# Patient Record
Sex: MALE | Race: WHITE | NOT HISPANIC OR LATINO | URBAN - METROPOLITAN AREA
[De-identification: names, ages, dates, MRNs, and addresses within clinical notes are randomized per-mention and may not be internally consistent; named-entity substitution may affect disease eponyms.]

---

## 2019-06-19 ENCOUNTER — OFFICE VISIT - HEALTHEAST (OUTPATIENT)
Dept: GERIATRICS | Facility: CLINIC | Age: 74
End: 2019-06-19

## 2019-06-19 DIAGNOSIS — I15.9 SECONDARY HYPERTENSION: ICD-10-CM

## 2019-06-19 DIAGNOSIS — F32.9 MAJOR DEPRESSIVE DISORDER, REMISSION STATUS UNSPECIFIED, UNSPECIFIED WHETHER RECURRENT: ICD-10-CM

## 2019-06-19 DIAGNOSIS — M25.461 EFFUSION OF RIGHT KNEE: ICD-10-CM

## 2019-06-19 DIAGNOSIS — I50.9 CONGESTIVE HEART FAILURE, UNSPECIFIED HF CHRONICITY, UNSPECIFIED HEART FAILURE TYPE (H): ICD-10-CM

## 2019-06-19 DIAGNOSIS — I48.19 PERSISTENT ATRIAL FIBRILLATION (H): ICD-10-CM

## 2019-06-20 ENCOUNTER — RECORDS - HEALTHEAST (OUTPATIENT)
Dept: LAB | Facility: CLINIC | Age: 74
End: 2019-06-20

## 2019-06-20 ENCOUNTER — AMBULATORY - HEALTHEAST (OUTPATIENT)
Dept: ADMINISTRATIVE | Facility: CLINIC | Age: 74
End: 2019-06-20

## 2019-06-20 RX ORDER — TORSEMIDE 10 MG/1
10 TABLET ORAL DAILY PRN
Status: SHIPPED | COMMUNITY
Start: 2019-06-20

## 2019-06-20 RX ORDER — ACETAMINOPHEN 500 MG
1000 TABLET ORAL 3 TIMES DAILY
Status: SHIPPED | COMMUNITY
Start: 2019-06-20

## 2019-06-21 LAB
ANION GAP SERPL CALCULATED.3IONS-SCNC: 8 MMOL/L (ref 5–18)
BUN SERPL-MCNC: 15 MG/DL (ref 8–28)
CALCIUM SERPL-MCNC: 8.7 MG/DL (ref 8.5–10.5)
CHLORIDE BLD-SCNC: 107 MMOL/L (ref 98–107)
CO2 SERPL-SCNC: 26 MMOL/L (ref 22–31)
CREAT SERPL-MCNC: 1.1 MG/DL (ref 0.7–1.3)
GFR SERPL CREATININE-BSD FRML MDRD: >60 ML/MIN/1.73M2
GLUCOSE BLD-MCNC: 104 MG/DL (ref 70–125)
POTASSIUM BLD-SCNC: 3.7 MMOL/L (ref 3.5–5)
SODIUM SERPL-SCNC: 141 MMOL/L (ref 136–145)

## 2019-06-27 ENCOUNTER — OFFICE VISIT - HEALTHEAST (OUTPATIENT)
Dept: GERIATRICS | Facility: CLINIC | Age: 74
End: 2019-06-27

## 2019-06-27 DIAGNOSIS — M25.461 EFFUSION OF RIGHT KNEE: ICD-10-CM

## 2019-06-27 DIAGNOSIS — I50.9 CONGESTIVE HEART FAILURE, UNSPECIFIED HF CHRONICITY, UNSPECIFIED HEART FAILURE TYPE (H): ICD-10-CM

## 2019-06-27 DIAGNOSIS — I48.19 PERSISTENT ATRIAL FIBRILLATION (H): ICD-10-CM

## 2019-06-27 DIAGNOSIS — I15.9 SECONDARY HYPERTENSION: ICD-10-CM

## 2019-07-05 ENCOUNTER — OFFICE VISIT - HEALTHEAST (OUTPATIENT)
Dept: GERIATRICS | Facility: CLINIC | Age: 74
End: 2019-07-05

## 2019-07-05 DIAGNOSIS — I50.9 CONGESTIVE HEART FAILURE, UNSPECIFIED HF CHRONICITY, UNSPECIFIED HEART FAILURE TYPE (H): ICD-10-CM

## 2019-07-05 DIAGNOSIS — I15.9 SECONDARY HYPERTENSION: ICD-10-CM

## 2019-07-05 DIAGNOSIS — Z87.898 HISTORY OF SYNCOPE: ICD-10-CM

## 2019-07-05 DIAGNOSIS — F32.9 MAJOR DEPRESSIVE DISORDER, REMISSION STATUS UNSPECIFIED, UNSPECIFIED WHETHER RECURRENT: ICD-10-CM

## 2019-07-05 DIAGNOSIS — I48.19 PERSISTENT ATRIAL FIBRILLATION (H): ICD-10-CM

## 2019-07-09 ENCOUNTER — OFFICE VISIT - HEALTHEAST (OUTPATIENT)
Dept: GERIATRICS | Facility: CLINIC | Age: 74
End: 2019-07-09

## 2019-07-09 DIAGNOSIS — I50.9 CONGESTIVE HEART FAILURE, UNSPECIFIED HF CHRONICITY, UNSPECIFIED HEART FAILURE TYPE (H): ICD-10-CM

## 2019-07-09 DIAGNOSIS — Z87.898 HISTORY OF SYNCOPE: ICD-10-CM

## 2019-07-09 DIAGNOSIS — M54.9 CHRONIC BACK PAIN, UNSPECIFIED BACK LOCATION, UNSPECIFIED BACK PAIN LATERALITY: ICD-10-CM

## 2019-07-09 DIAGNOSIS — I10 ESSENTIAL HYPERTENSION: ICD-10-CM

## 2019-07-09 DIAGNOSIS — Z79.01 ANTICOAGULATION ADEQUATE: ICD-10-CM

## 2019-07-09 DIAGNOSIS — G89.29 CHRONIC BACK PAIN, UNSPECIFIED BACK LOCATION, UNSPECIFIED BACK PAIN LATERALITY: ICD-10-CM

## 2019-07-09 DIAGNOSIS — I48.19 PERSISTENT ATRIAL FIBRILLATION (H): ICD-10-CM

## 2019-07-09 DIAGNOSIS — I48.92 ATRIAL FLUTTER, UNSPECIFIED TYPE (H): ICD-10-CM

## 2019-07-11 ENCOUNTER — AMBULATORY - HEALTHEAST (OUTPATIENT)
Dept: GERIATRICS | Facility: CLINIC | Age: 74
End: 2019-07-11

## 2020-03-02 ENCOUNTER — OFFICE VISIT - HEALTHEAST (OUTPATIENT)
Dept: GERIATRICS | Facility: CLINIC | Age: 75
End: 2020-03-02

## 2020-03-02 ENCOUNTER — AMBULATORY - HEALTHEAST (OUTPATIENT)
Dept: GERIATRICS | Facility: CLINIC | Age: 75
End: 2020-03-02

## 2020-03-02 DIAGNOSIS — R21 RASH: ICD-10-CM

## 2020-03-02 DIAGNOSIS — I48.19 PERSISTENT ATRIAL FIBRILLATION (H): ICD-10-CM

## 2020-03-02 DIAGNOSIS — I50.9 CONGESTIVE HEART FAILURE, UNSPECIFIED HF CHRONICITY, UNSPECIFIED HEART FAILURE TYPE (H): ICD-10-CM

## 2020-03-02 DIAGNOSIS — S82.831D CLOSED FRACTURE OF DISTAL END OF RIGHT FIBULA WITH ROUTINE HEALING, UNSPECIFIED FRACTURE MORPHOLOGY, SUBSEQUENT ENCOUNTER: ICD-10-CM

## 2020-03-02 RX ORDER — FERROUS SULFATE 325(65) MG
1 TABLET ORAL
Status: SHIPPED | COMMUNITY
Start: 2020-03-02

## 2020-03-02 RX ORDER — METOPROLOL SUCCINATE 25 MG/1
12.5 TABLET, EXTENDED RELEASE ORAL DAILY
Status: SHIPPED | COMMUNITY
Start: 2020-03-02

## 2020-03-02 RX ORDER — LISINOPRIL 2.5 MG/1
2.5 TABLET ORAL DAILY
Status: SHIPPED | COMMUNITY
Start: 2020-03-02

## 2020-03-04 ENCOUNTER — OFFICE VISIT - HEALTHEAST (OUTPATIENT)
Dept: GERIATRICS | Facility: CLINIC | Age: 75
End: 2020-03-04

## 2020-03-04 DIAGNOSIS — B02.8 HERPES ZOSTER WITH COMPLICATION: ICD-10-CM

## 2020-03-06 ENCOUNTER — OFFICE VISIT - HEALTHEAST (OUTPATIENT)
Dept: GERIATRICS | Facility: CLINIC | Age: 75
End: 2020-03-06

## 2020-03-06 DIAGNOSIS — M10.9 ACUTE GOUT INVOLVING TOE OF LEFT FOOT, UNSPECIFIED CAUSE: ICD-10-CM

## 2020-03-12 ENCOUNTER — OFFICE VISIT - HEALTHEAST (OUTPATIENT)
Dept: GERIATRICS | Facility: CLINIC | Age: 75
End: 2020-03-12

## 2020-03-12 DIAGNOSIS — I42.9 CARDIOMYOPATHY, UNSPECIFIED TYPE (H): ICD-10-CM

## 2020-03-12 DIAGNOSIS — I48.92 ATRIAL FLUTTER, UNSPECIFIED TYPE (H): ICD-10-CM

## 2020-03-12 DIAGNOSIS — I10 ESSENTIAL HYPERTENSION: ICD-10-CM

## 2020-03-12 DIAGNOSIS — S82.831D CLOSED FRACTURE OF DISTAL END OF RIGHT FIBULA WITH ROUTINE HEALING, UNSPECIFIED FRACTURE MORPHOLOGY, SUBSEQUENT ENCOUNTER: ICD-10-CM

## 2020-03-17 ENCOUNTER — OFFICE VISIT - HEALTHEAST (OUTPATIENT)
Dept: GERIATRICS | Facility: CLINIC | Age: 75
End: 2020-03-17

## 2020-03-17 DIAGNOSIS — I10 ESSENTIAL HYPERTENSION: ICD-10-CM

## 2020-03-17 DIAGNOSIS — S82.831D CLOSED FRACTURE OF DISTAL END OF RIGHT FIBULA WITH ROUTINE HEALING, UNSPECIFIED FRACTURE MORPHOLOGY, SUBSEQUENT ENCOUNTER: ICD-10-CM

## 2020-03-17 DIAGNOSIS — I50.9 CONGESTIVE HEART FAILURE, UNSPECIFIED HF CHRONICITY, UNSPECIFIED HEART FAILURE TYPE (H): ICD-10-CM

## 2020-03-17 DIAGNOSIS — I48.92 ATRIAL FLUTTER, UNSPECIFIED TYPE (H): ICD-10-CM

## 2020-03-20 ENCOUNTER — RECORDS - HEALTHEAST (OUTPATIENT)
Dept: LAB | Facility: CLINIC | Age: 75
End: 2020-03-20

## 2020-03-20 LAB
ANION GAP SERPL CALCULATED.3IONS-SCNC: 10 MMOL/L (ref 5–18)
BUN SERPL-MCNC: 12 MG/DL (ref 8–28)
CALCIUM SERPL-MCNC: 8.7 MG/DL (ref 8.5–10.5)
CHLORIDE BLD-SCNC: 106 MMOL/L (ref 98–107)
CO2 SERPL-SCNC: 24 MMOL/L (ref 22–31)
CREAT SERPL-MCNC: 1.16 MG/DL (ref 0.7–1.3)
ERYTHROCYTE [DISTWIDTH] IN BLOOD BY AUTOMATED COUNT: ABNORMAL %
GFR SERPL CREATININE-BSD FRML MDRD: >60 ML/MIN/1.73M2
GLUCOSE BLD-MCNC: 90 MG/DL (ref 70–125)
HCT VFR BLD AUTO: 37.5 % (ref 40–54)
HGB BLD-MCNC: 10.9 G/DL (ref 14–18)
MCH RBC QN AUTO: 24.2 PG (ref 27–34)
MCHC RBC AUTO-ENTMCNC: 29.1 G/DL (ref 32–36)
MCV RBC AUTO: 83 FL (ref 80–100)
PLATELET # BLD AUTO: 327 THOU/UL (ref 140–440)
PMV BLD AUTO: 10.2 FL (ref 8.5–12.5)
POTASSIUM BLD-SCNC: 4 MMOL/L (ref 3.5–5)
RBC # BLD AUTO: 4.51 MILL/UL (ref 4.4–6.2)
SODIUM SERPL-SCNC: 140 MMOL/L (ref 136–145)
WBC: 4.9 THOU/UL (ref 4–11)

## 2020-03-23 ENCOUNTER — OFFICE VISIT - HEALTHEAST (OUTPATIENT)
Dept: GERIATRICS | Facility: CLINIC | Age: 75
End: 2020-03-23

## 2020-03-23 DIAGNOSIS — S82.831D CLOSED FRACTURE OF DISTAL END OF RIGHT FIBULA WITH ROUTINE HEALING, UNSPECIFIED FRACTURE MORPHOLOGY, SUBSEQUENT ENCOUNTER: ICD-10-CM

## 2020-03-23 DIAGNOSIS — I48.19 PERSISTENT ATRIAL FIBRILLATION (H): ICD-10-CM

## 2020-03-23 RX ORDER — AMOXICILLIN 250 MG
1 CAPSULE ORAL 2 TIMES DAILY PRN
Status: SHIPPED | COMMUNITY
Start: 2020-03-23

## 2020-03-25 ENCOUNTER — AMBULATORY - HEALTHEAST (OUTPATIENT)
Dept: GERIATRICS | Facility: CLINIC | Age: 75
End: 2020-03-25

## 2021-05-29 NOTE — PROGRESS NOTES
[facility]    Facility:   [facility] [[id number]]   Code Status: FULL CODE      CHIEF COMPLAINT/REASON FOR VISIT:  Chief Complaint   Patient presents with     Review Of Multiple Medical Conditions       HISTORY:      HPI: Chip is a 73 y.o. male undergoing physical and occupational therapy at [facility].     Today he is seen for  Routine first visit to review multiple medical issues. He denied CP or  shortness of breath however he is on 2L NC.  Records indicated that he quit smoking years ago but he is found to have cigarettes among his belongings. He tells me he started again and refused interventions when offered. He reports no BM x 5 days. He is passing flatus and had no abdominal tenderness. There were orders that he was discharged with an ACT monitor. He tells me it was taken of fin the hospital. Staff to follow up with orders. He will have a BMP on 6/21/19. He is on daily weights with PRN torsemide .     Past Medical History:   Diagnosis Date     Chronic lower back pain      Hypertension      Opiate abuse, continuous              No family history on file.  Social History     Socioeconomic History     Marital status:      Spouse name: Not on file     Number of children: Not on file     Years of education: Not on file     Highest education level: Not on file   Occupational History     Not on file   Social Needs     Financial resource strain: Not on file     Food insecurity:     Worry: Not on file     Inability: Not on file     Transportation needs:     Medical: Not on file     Non-medical: Not on file   Tobacco Use     Smoking status: Unknown If Ever Smoked   Substance and Sexual Activity     Alcohol use: No     Drug use: No     Sexual activity: Never   Lifestyle     Physical activity:     Days per week: Not on file     Minutes per session: Not on file     Stress: Not on file   Relationships     Social connections:     Talks on phone: Not  on file     Gets together: Not on file     Attends Zoroastrianism service: Not on file     Active member of club or organization: Not on file     Attends meetings of clubs or organizations: Not on file     Relationship status: Not on file     Intimate partner violence:     Fear of current or ex partner: Not on file     Emotionally abused: Not on file     Physically abused: Not on file     Forced sexual activity: Not on file   Other Topics Concern     Not on file   Social History Narrative     Not on file         Review of Systems   Constitutional: Negative for activity change, appetite change, chills, fatigue and fever.   HENT: Negative for congestion and sore throat.    Eyes: Negative for visual disturbance.   Respiratory: Positive for shortness of breath. Negative for cough and wheezing.         Currently on 2L oxygen NC    Cardiovascular: Negative for chest pain and leg swelling.   Gastrointestinal: Negative for abdominal distention, abdominal pain, constipation, diarrhea and nausea.   Genitourinary: Negative for dysuria.   Musculoskeletal: Negative for arthralgias and myalgias.   Skin: Negative for color change, rash and wound.   Neurological: Negative for dizziness, weakness and numbness.   Psychiatric/Behavioral: Negative for agitation, behavioral problems and sleep disturbance.       .  Vitals:    06/19/19 0841   BP: 127/81   Pulse: 74   Resp: 18   Temp: 97.4  F (36.3  C)   SpO2: 95%   Weight: (!) 281 lb 8 oz (127.7 kg)       Physical Exam   Constitutional: He is oriented to person, place, and time. He appears well-developed and well-nourished.   Pleasant gentleman in no acute distress.    HENT:   Head: Normocephalic and atraumatic.   Eyes: Pupils are equal, round, and reactive to light. Conjunctivae are normal.   Neck: Normal range of motion. Neck supple.   Cardiovascular: Normal rate, regular rhythm and normal heart sounds.   No murmur heard.  Pulmonary/Chest: Effort normal and breath sounds normal. He has no  wheezes. He has no rales.   Abdominal: Soft. Bowel sounds are normal. He exhibits no distension. There is no tenderness.   Musculoskeletal: Normal range of motion. He exhibits no edema.   Right knee effusion    Neurological: He is alert and oriented to person, place, and time.   Skin: Skin is warm and dry.   Psychiatric: He has a normal mood and affect. His behavior is normal.         LABS:   No results found for this or any previous visit (from the past 240 hour(s)).  Current Outpatient Medications   Medication Sig     aspirin 81 mg chewable tablet Chew 1 tablet (81 mg total) daily.     atorvastatin (LIPITOR) 20 MG tablet Take 20 mg by mouth bedtime.     beclomethasone (QVAR) 80 mcg/actuation inhaler Inhale 1 puff 2 (two) times a day.     diltiazem (CARDIZEM CD) 240 MG 24 hr capsule Take 240 mg by mouth daily.     DULoxetine (CYMBALTA) 30 MG capsule Take 1 capsule (30 mg total) by mouth every morning.     metoprolol tartrate (LOPRESSOR) 50 MG tablet Take 50 mg by mouth 2 (two) times a day.     nitroglycerin (NITROSTAT) 0.4 MG SL tablet Place 0.4 mg under the tongue every 5 (five) minutes as needed for chest pain.     omeprazole (PRILOSEC) 20 MG capsule Take 1 capsule (20 mg total) by mouth Daily before breakfast.     senna-docusate (PERICOLACE) 8.6-50 mg tablet Take 2 tablets by mouth 2 (two) times a day.     warfarin (COUMADIN) 1 MG tablet Take 1.5 tablet by mouth daily per pharmacist order.. Adjust dose based on INR results on 9/26/2016.as directed.     ASSESSMENT:      ICD-10-CM    1. Persistent atrial fibrillation (H) I48.1    2. Major depressive disorder, remission status unspecified, unspecified whether recurrent F32.9    3. Congestive heart failure, unspecified HF chronicity, unspecified heart failure type (H) I50.9    4. Secondary hypertension I15.9    5. Effusion of right knee M25.461        PLAN:    Atrial fibrillation- on coumadin and metoprolol  CHF- daily weights, prn torsemide   Major depression on  Cymbal  Tobacco abuse records indicate he quit 8 years ago however he restarted and declined interventions.   Hypertension- on metoprolol-stable.  Right knee effusion-pain control, ice, Scheduled tylenol, seen by orthopedic who declined tapping the knee at this time due to risk of infection- follow up if worsens.   Constipation - bowel medications adjusted.   Anticoagulation - On coumadin Adjust medication per INR results.     Electronically signed by: Malina Verdin CNP

## 2021-05-30 NOTE — PROGRESS NOTES
[facility]      Facility:    [facility] [[id number]]  Code Status: FULL CODE       Chief Complaint/Reason for Visit:  Chief Complaint   Patient presents with     H & P     Admit note to TCU-syncope, ischemic CM, aflutter.        HPI:   Chip is a 73 y.o. male with hx of ischemic CM, chronic back pain, CKD, HTN, aflutter on xarelto, presented to the hospital on 6/13/19 with syncope. His hospital discharge summary is partially excerpted below.     Hospital Course:   Chip Martin is a 73 y.o. male with PMHx of CAD, MI s/p BMS x2 to LAD and PTCA to D1 (2012), ischemic cardiomyopathy (LVEF 40-45%), paroxysmal atrial flutter (on sotalol and Xarelto), hypertension, dyslipidemia, CKD 3, DAVID (intolerant to CPAP), 100-pack-year history of smoking who was recently hospitalized for similar presentation of syncope and collapse at [facility] 6/2-6/3 and prior to that on 3/11-3/13.  He was seen by Cardiology during hospitalization in 3/2019 and as outpatient after hospital discharge on 4/4.  Syncope was considered related to medication effect therefore diltiazem was discontinued and Coreg was titrated to 6.25 milligrams twice daily after cardiology visit on 4/4/2019.  Nuclear perfusion cardiac stress test was planned to be done before his appointment with Dr. Montoya in 4 to 5 months after his 4/2019 appointment with Cardiology.  He remains intolerant to CPAP and is not on home oxygen.  [place] [employer], felt lightheaded and passed out. ED evaluation showed mild hypoxia of 86% on room air, /81 pulse 69 in sinus rhythm.  Troponin negative, EKG unremarkable for ischemia or arrhythmia. Chest radiograph shows no infiltrate or CHF.  BNP is 24.  He was given IV fluids, started on oxygen and he started feeling better. He was admitted for further evaluation.    Cardiology was consulted following admission. Etiology unclear, appears neurocardiogenic (orthostatis  negative, no profound hypotension). Monitored on telemetry to exclude arrhythmic etiology. TTE was technically difficult, but LVEF 40-45% with mild global hypokinesis with possible akinetic apical lateral segment, no significant valvular disease, and carotid sinus massage did not result in sinus arrest. Dose of beta blocker was reduced, and Cardiology recommended 14 ACT monitor upon dismissal to TCU. MPI can be completed as outpatient.    During the hospitalization he was also seen by Pulmonology for mild hypercapnea and hypoxemia. Likely due to COPD and ANA MARIA, will require outpatient pulmonary evaluation and should return to [facility] for follow-up when able. He is discharging to TCU on supplemental oxygen 2L NC.    The patient was also seen by orthopedics for significant R knee effusion. Diagnostic and therapeutic tap was considered but deferred due to concern for infectious risk. He had symptomatic improvement with anti-inflammatories. He did have elevated uric acid 7.8 and was started on low dose allopurinol earlier in the hospitalization. If persistent swelling and/or pain, can reconsider joint tap.    Overall stabilized and discharged to TCU on 6/18/19 for PT, OT, nursing cares, medical management and monitoring.     Today:  He has been doing well, participating with therapy, gaining strength. He plans to return home to independent living later this week. He currently denies any chest pain, shortness of breath. Appetite is good. No diarrhea or constipation. No dizziness or syncopal episodes in TCU. He has no new vision or hearing concerns.  Complains of chronic back and knee pain, no new concerns.       Past Medical History:  Past Medical History:   Diagnosis Date     ALEX (acute kidney injury) (H)      Atrial fibrillation (H)      CAD (coronary artery disease)      Chronic bronchitis (H)      Chronic hypercapnic respiratory failure (H)      Chronic lower back pain      CKD (chronic kidney disease)      COPD (chronic  obstructive pulmonary disease) (H)      Depressive disorder      ED (erectile dysfunction)      HLD (hyperlipidemia)      Hypertension      Ischemic cardiomyopathy      Knee effusion, right      Lumbago      Opiate abuse, continuous (H)      DAVID (obstructive sleep apnea)      Prostate cancer (H)      Syncope      Tobacco use disorder      Vitamin D deficiency            Surgical History:  Past Surgical History:   Procedure Laterality Date     BACK SURGERY       CORONARY ANGIOPLASTY       LAPAROSCOPIC CHOLECYSTECTOMY  2016     PROSTATE BIOPSY         Family History:   Family History   Problem Relation Age of Onset     Heart disease Father      Heart disease Brother        Social History:    Social History     Socioeconomic History     Marital status:      Spouse name: Not on file     Number of children: Not on file     Years of education: Not on file     Highest education level: Not on file   Occupational History     Not on file   Social Needs     Financial resource strain: Not on file     Food insecurity:     Worry: Not on file     Inability: Not on file     Transportation needs:     Medical: Not on file     Non-medical: Not on file   Tobacco Use     Smoking status: Former Smoker     Packs/day: 2.00     Years: 50.00     Pack years: 100.00     Types: Cigarettes     Last attempt to quit: 2011     Years since quittin.5     Smokeless tobacco: Never Used   Substance and Sexual Activity     Alcohol use: No     Drug use: No     Sexual activity: Never   Lifestyle     Physical activity:     Days per week: Not on file     Minutes per session: Not on file     Stress: Not on file   Relationships     Social connections:     Talks on phone: Not on file     Gets together: Not on file     Attends Mosque service: Not on file     Active member of club or organization: Not on file     Attends meetings of clubs or organizations: Not on file     Relationship status: Not on file     Intimate partner violence:     " Fear of current or ex partner: Not on file     Emotionally abused: Not on file     Physically abused: Not on file     Forced sexual activity: Not on file   Other Topics Concern     Not on file   Social History Narrative     Not on file       Medications:  Current Outpatient Medications   Medication Sig     acetaminophen (TYLENOL) 500 MG tablet Take 1,000 mg by mouth every 6 (six) hours as needed for pain.     allopurinol (ZYLOPRIM) 100 MG tablet Take 100 mg by mouth daily.     aspirin 81 mg chewable tablet Chew 1 tablet (81 mg total) daily.     atorvastatin (LIPITOR) 20 MG tablet Take 40 mg by mouth at bedtime.            carvedilol (COREG) 3.125 MG tablet Take 3.125 mg by mouth 2 (two) times a day with meals.     fluticasone furoate-vilanterol (BREO ELLIPTA) 200-25 mcg/dose DsDv inhaler Inhale 1 puff daily.     nitroglycerin (NITROSTAT) 0.4 MG SL tablet Place 0.4 mg under the tongue every 5 (five) minutes as needed for chest pain.     polyethylene glycol (MIRALAX) 17 gram packet Take 17 g by mouth daily as needed.     rivaroxaban (XARELTO) 20 mg tablet Take 20 mg by mouth daily with supper.     senna-docusate (PERICOLACE) 8.6-50 mg tablet Take 2 tablets by mouth 2 (two) times a day. (Patient taking differently: Take 2 tablets by mouth 2 (two) times a day as needed.       )     sotalol (BETAPACE) 80 MG tablet Take 40 mg by mouth at bedtime.     torsemide (DEMADEX) 10 MG tablet Take 10 mg by mouth daily as needed. Weight gain over 3 lbs or increased leg swelling       Allergies:  Allergies   Allergen Reactions     Blood-Group Specific Substance      Patient has Anti-Big E antibody.  Blood product orders may be delayed.  Please draw one red top and two lavender top tubes for all Type and Screen/Type and Crossmatch orders.     Gadopentetate Dimeglumine      Other reaction(s): Syncope  \"on the floor\", states that he passed out     Gadoversetamide      Other reaction(s): Syncope  \"on the floor\", states that he passed " out     Iodinated Contrast- Oral And Iv Dye      Syncope          Review of Systems:  Pertinent items are noted in HPI.      Physical Exam:   General: Patient is alert male, no distress.   Vitals: /78, Temp 98, Pulse 78, RR 18, O2 sat 94% RA.  HEENT: Head is NCAT. Eyes show no injection or icterus. Nares negative. Oropharynx well hydrated.  Neck: Supple. No tenderness or adenopathy. No JVD.  Lungs: Diminished though clear bilaterally. No wheezes.  Cardiovascular: Regular rate and rhythm, normal S1. S2.  Back: No spinal or CVA tenderness.  Abdomen: Obese, soft, no tenderness on exam. Bowel sounds present. No guarding rebound or rigidity.  : Deferred.  Extremities: Mod LE edema is noted.  Musculoskeletal: Age related degen changes.   Skin: No rashes.   Psych: Mood appears good.      Labs:  Results for orders placed or performed in visit on 06/21/19   Basic Metabolic Panel   Result Value Ref Range    Sodium 141 136 - 145 mmol/L    Potassium 3.7 3.5 - 5.0 mmol/L    Chloride 107 98 - 107 mmol/L    CO2 26 22 - 31 mmol/L    Anion Gap, Calculation 8 5 - 18 mmol/L    Glucose 104 70 - 125 mg/dL    Calcium 8.7 8.5 - 10.5 mg/dL    BUN 15 8 - 28 mg/dL    Creatinine 1.10 0.70 - 1.30 mg/dL    GFR MDRD Af Amer >60 >60 mL/min/1.73m2    GFR MDRD Non Af Amer >60 >60 mL/min/1.73m2       Assessment/Plan:  1. History of syncope. No episodes in TCU. Had cardiac monitor, will follow up with cardiology. Meds adjusted in the hospital.   2. HTN. BPs satisfactory overall. Continue on coreg.  3. Aflutter. Rate controlled, on sotalol and xarelto.  4. Chronic back pain. Baseline, no new concerns.   5. Chronic bronchitis. No respiratory concerns.  6. CKD. Labs as noted above. ALEX in the hospital.   7. Right knee effusion. Improved. Follow up with ortho if further concerns.  8. DAVID. Intolerant of CPAP.  9. Ischemic CM. On carvedilol and torsemide.  10. Code status is full code.            Electronically signed by: Michaela Navarro  MD

## 2021-05-30 NOTE — PROGRESS NOTES
[facility]    Facility:   [facility] [[id number]]   Code Status: FULL CODE  PCP: Lynne Gomez MD   Phone: [phone]   Fax: [phone]      CHIEF COMPLAINT/REASON FOR VISIT:  Chief Complaint   Patient presents with     Discharge Summary       HISTORY COURSE:  Chip is a 73 y.o. male undergoing physical and occupational therapy at [facility].  He is with PMHx of CAD, MI s/p BMS x2 to LAD and PTCA to D1 (2012), ischemic cardiomyopathy (LVEF 40-45%), paroxysmal atrial flutter (on sotalol and Xarelto), hypertension, dyslipidemia, CKD 3, DAVID (intolerant to CPAP), 100-pack-year history of smoking who was recently hospitalized for similar presentation of syncope and collapse at [facility] 6/2-6/3 and prior to that on 3/11-3/13.  He was seen by Cardiology during hospitalization in 3/2019 and as outpatient after hospital discharge on 4/4.  Syncope was considered related to medication effect therefore diltiazem was discontinued and Coreg was titrated to 6.25 milligrams twice daily after cardiology visit on 4/4/2019.  Nuclear perfusion cardiac stress test was planned to be done before his appointment with Dr. Montoya in 4 to 5 months after his 4/2019 appointment with Cardiology.  He remains intolerant to CPAP and is not on home oxygen.  [place] [employer], felt lightheaded and passed out. ED evaluation showed mild hypoxia of 86% on room air, /81 pulse 69 in sinus rhythm.  Troponin negative, EKG unremarkable for ischemia or arrhythmia. Chest radiograph shows no infiltrate or CHF.  BNP is 24.  He was given IV fluids, started on oxygen and he started feeling better. He was admitted for further evaluation.    Cardiology was consulted following admission. Etiology unclear, appears neurocardiogenic (orthostatis negative, no profound hypotension). Monitored on telemetry to exclude arrhythmic etiology. TTE was technically difficult, but LVEF  40-45% with mild global hypokinesis with possible akinetic apical lateral segment, no significant valvular disease, and carotid sinus massage did not result in sinus arrest. Dose of beta blocker was reduced, and Cardiology recommended 14 ACT monitor upon dismissal to TCU. MPI can be completed as outpatient.         Today he is seen for a face-to-face discharge.  He will discharge on 7/10/2019 back to his senior living apartment with current medications and treatments.  He will have [facility] home care services PT OT and a home health aide. He denied CP or  shortness of breath     He is moving his bowels and denied any urinary issues. His  BMP on 6/21/19 was within normal limits.  His lower extremity edema and right knee effusion are significantly decreased.  His gunter has been dc'd and he is voiding adequately.       Review of Systems  Constitutional: Negative for activity change, appetite change, chills, fatigue and fever.   HENT: Negative for congestion and sore throat.    Eyes: Negative for visual disturbance.   Respiratory: Positive for shortness of breath. Negative for cough and wheezing.    Cardiovascular: Negative for chest pain and leg swelling.   Gastrointestinal: Negative for abdominal distention, abdominal pain, constipation, diarrhea and nausea.   Genitourinary: Negative for dysuria.   Musculoskeletal: Negative for arthralgias and myalgias.   Skin: Negative for color change, rash and wound.   Neurological: Negative for dizziness, weakness and numbness.   Psychiatric/Behavioral: Negative for agitation, behavioral problems and sleep disturbance.   Vitals:    07/09/19 1146   BP: 125/67   Pulse: 76   Resp: 18   Temp: 97.5  F (36.4  C)   SpO2: 96%   Weight: (!) 273 lb 11.2 oz (124.1 kg)       Physical Exam  Constitutional: He is oriented to person, place, and time. He appears well-developed and well-nourished.   Pleasant gentleman in no acute distress.    HENT:   Head: Normocephalic and atraumatic.   Eyes: Pupils  are equal, round, and reactive to light. Conjunctivae are normal.   Neck: Normal range of motion. Neck supple.   Cardiovascular: Normal rate, regular rhythm and normal heart sounds.   No murmur heard.  Pulmonary/Chest: Effort normal and breath sounds normal. He has no wheezes. He has no rales.   Abdominal: Soft. Bowel sounds are normal. He exhibits no distension. There is no tenderness.   Musculoskeletal: Normal range of motion. He exhibits edema.   Right knee effusion   1+ LLE  2+ RLE   Neurological: He is alert and oriented to person, place, and time.   Skin: Skin is warm and dry.   Psychiatric: He has a normal mood and affect. His behavior is normal.      MEDICATION LIST:  Current Outpatient Medications   Medication Sig     acetaminophen (TYLENOL) 500 MG tablet Take 1,000 mg by mouth every 6 (six) hours as needed for pain.     allopurinol (ZYLOPRIM) 100 MG tablet Take 100 mg by mouth daily.     aspirin 81 mg chewable tablet Chew 1 tablet (81 mg total) daily.     atorvastatin (LIPITOR) 20 MG tablet Take 40 mg by mouth at bedtime.            carvedilol (COREG) 3.125 MG tablet Take 3.125 mg by mouth 2 (two) times a day with meals.     fluticasone furoate-vilanterol (BREO ELLIPTA) 200-25 mcg/dose DsDv inhaler Inhale 1 puff daily.     nitroglycerin (NITROSTAT) 0.4 MG SL tablet Place 0.4 mg under the tongue every 5 (five) minutes as needed for chest pain.     polyethylene glycol (MIRALAX) 17 gram packet Take 17 g by mouth daily as needed.     rivaroxaban (XARELTO) 20 mg tablet Take 20 mg by mouth daily with supper.     senna-docusate (PERICOLACE) 8.6-50 mg tablet Take 2 tablets by mouth 2 (two) times a day. (Patient taking differently: Take 2 tablets by mouth 2 (two) times a day as needed.       )     sotalol (BETAPACE) 80 MG tablet Take 40 mg by mouth at bedtime.     torsemide (DEMADEX) 10 MG tablet Take 10 mg by mouth daily as needed. Weight gain over 3 lbs or increased leg swelling       DISCHARGE DIAGNOSIS:     ICD-10-CM    1. History of syncope Z87.898    2. Congestive heart failure, unspecified HF chronicity, unspecified heart failure type (H) I50.9    3. Persistent atrial fibrillation (H) I48.1    4. Anticoagulation adequate Z79.01        MEDICAL EQUIPMENT NEEDS:  None needed    DISCHARGE PLAN/FACE TO FACE:  I certify that services are/were furnished while this patient was under the care of a physician and that a physician or an allowed non-physician practitioner (NPP), had a face-to-face encounter that meets the physician face-to-face encounter requirements. The encounter was in whole, or in part, related to the primary reason for home health. The patient is confined to his/her home and needs intermittent skilled nursing, physical therapy, speech-language pathology, or the continued need for occupational therapy. A plan of care has been established by a physician and is periodically reviewed by a physician.  Date of Face-to-Face Encounter: 7/9/19    I certify that, based on my findings, the following services are medically necessary home health services: [facility] PT/OT/HHA    My clinical findings support the need for the above skilled services because: PT OT for continued strength and endurance following recent hospitalization for syncope, home health aide to assist with activities of daily living.    This patient is homebound because: The patient is homebound because he is easily fatigued and deconditioned following recent hospitalization for syncope, requiring continued PT OT.    The patient is, or has been, under my care and I have initiated the establishment of the plan of care. This patient will be followed by a physician who will periodically review the plan of care.    Schedule follow up visit with primary care provider within 7 days to reestablish care.    Electronically signed by: Malina Verdin CNP     Electronically signed by: Michaela Navarro MD

## 2021-05-30 NOTE — PROGRESS NOTES
[facility]    Facility:   [facility] [[id number]]   Code Status: FULL CODE      CHIEF COMPLAINT/REASON FOR VISIT:  Chief Complaint   Patient presents with     Review Of Multiple Medical Conditions       HISTORY:      HPI: Chip is a 73 y.o. male undergoing physical and occupational therapy at [facility].  He is with PMHx of CAD, MI s/p BMS x2 to LAD and PTCA to D1 (2012), ischemic cardiomyopathy (LVEF 40-45%), paroxysmal atrial flutter (on sotalol and Xarelto), hypertension, dyslipidemia, CKD 3, DAVID (intolerant to CPAP), 100-pack-year history of smoking who was recently hospitalized for similar presentation of syncope and collapse at [facility] 6/2-6/3 and prior to that on 3/11-3/13.  He was seen by Cardiology during hospitalization in 3/2019 and as outpatient after hospital discharge on 4/4.  Syncope was considered related to medication effect therefore diltiazem was discontinued and Coreg was titrated to 6.25 milligrams twice daily after cardiology visit on 4/4/2019.  Nuclear perfusion cardiac stress test was planned to be done before his appointment with Dr. Montoya in 4 to 5 months after his 4/2019 appointment with Cardiology.  He remains intolerant to CPAP and is not on home oxygen.  He was walking in [employer], felt lightheaded and passed out. ED evaluation showed mild hypoxia of 86% on room air, /81 pulse 69 in sinus rhythm.  Troponin negative, EKG unremarkable for ischemia or arrhythmia. Chest radiograph shows no infiltrate or CHF.  BNP is 24.  He was given IV fluids, started on oxygen and he started feeling better. He was admitted for further evaluation.    Cardiology was consulted following admission. Etiology unclear, appears neurocardiogenic (orthostatis negative, no profound hypotension). Monitored on telemetry to exclude arrhythmic etiology. TTE was technically difficult, but LVEF 40-45% with mild global hypokinesis with  possible akinetic apical lateral segment, no significant valvular disease, and carotid sinus massage did not result in sinus arrest. Dose of beta blocker was reduced, and Cardiology recommended 14 ACT monitor upon dismissal to TCU. MPI can be completed as outpatient.       Today he is seen for a  routine  visit to review multiple medical issues. He denied CP or  shortness of breath    He reports no BM x 2 days. And staff reported he has been refusing his bowel medications. He was educated on taking them unless he has loose stools. He is passing flatus and had no abdominal tenderness. His  BMP on 6/21/19 was within normal limits. . He is on daily weights with PRN torsemide . He is down 5.5 pounds over the last week. His lower extremity edema and right knee effusion are significantly decreased.  His gunter has been dc'd and he is voiding adequately. He is now wearing his heart monitor and will wear it until 7/8.    Past Medical History:   Diagnosis Date     ALEX (acute kidney injury) (H)      Atrial fibrillation (H)      CAD (coronary artery disease)      Chronic bronchitis (H)      Chronic hypercapnic respiratory failure (H)      Chronic lower back pain      CKD (chronic kidney disease)      COPD (chronic obstructive pulmonary disease) (H)      Depressive disorder      ED (erectile dysfunction)      HLD (hyperlipidemia)      Hypertension      Ischemic cardiomyopathy      Knee effusion, right      Lumbago      Opiate abuse, continuous (H)      DAVID (obstructive sleep apnea)      Prostate cancer (H)      Syncope      Tobacco use disorder      Vitamin D deficiency              Family History   Problem Relation Age of Onset     Heart disease Father      Heart disease Brother      Social History     Socioeconomic History     Marital status:      Spouse name: Not on file     Number of children: Not on file     Years of education: Not on file     Highest education level: Not on file   Occupational History     Not on  file   Social Needs     Financial resource strain: Not on file     Food insecurity:     Worry: Not on file     Inability: Not on file     Transportation needs:     Medical: Not on file     Non-medical: Not on file   Tobacco Use     Smoking status: Former Smoker     Packs/day: 2.00     Years: 50.00     Pack years: 100.00     Types: Cigarettes     Last attempt to quit: 2011     Years since quittin.4     Smokeless tobacco: Never Used   Substance and Sexual Activity     Alcohol use: No     Drug use: No     Sexual activity: Never   Lifestyle     Physical activity:     Days per week: Not on file     Minutes per session: Not on file     Stress: Not on file   Relationships     Social connections:     Talks on phone: Not on file     Gets together: Not on file     Attends Yarsanism service: Not on file     Active member of club or organization: Not on file     Attends meetings of clubs or organizations: Not on file     Relationship status: Not on file     Intimate partner violence:     Fear of current or ex partner: Not on file     Emotionally abused: Not on file     Physically abused: Not on file     Forced sexual activity: Not on file   Other Topics Concern     Not on file   Social History Narrative     Not on file         Review of Systems   Constitutional: Negative for activity change, appetite change, chills, fatigue and fever.   HENT: Negative for congestion and sore throat.    Eyes: Negative for visual disturbance.   Respiratory: Positive for shortness of breath. Negative for cough and wheezing.         Currently on 2L oxygen NC    Cardiovascular: Negative for chest pain and leg swelling.   Gastrointestinal: Negative for abdominal distention, abdominal pain, constipation, diarrhea and nausea.   Genitourinary: Negative for dysuria.   Musculoskeletal: Negative for arthralgias and myalgias.   Skin: Negative for color change, rash and wound.   Neurological: Negative for dizziness, weakness and numbness.    Psychiatric/Behavioral: Negative for agitation, behavioral problems and sleep disturbance.       .  Vitals:    06/27/19 0822   BP: 116/76   Pulse: 84   Resp: 20   Temp: 97.7  F (36.5  C)   SpO2: 92%   Weight: (!) 280 lb 3.2 oz (127.1 kg)       Physical Exam   Constitutional: He is oriented to person, place, and time. He appears well-developed and well-nourished.   Pleasant gentleman in no acute distress.    HENT:   Head: Normocephalic and atraumatic.   Eyes: Pupils are equal, round, and reactive to light. Conjunctivae are normal.   Neck: Normal range of motion. Neck supple.   Cardiovascular: Normal rate, regular rhythm and normal heart sounds.   No murmur heard.  Pulmonary/Chest: Effort normal and breath sounds normal. He has no wheezes. He has no rales.   Abdominal: Soft. Bowel sounds are normal. He exhibits no distension. There is no tenderness.   Musculoskeletal: Normal range of motion. He exhibits edema.   Right knee effusion   1+ LLE  2+ RLE   Neurological: He is alert and oriented to person, place, and time.   Skin: Skin is warm and dry.   Psychiatric: He has a normal mood and affect. His behavior is normal.         LABS:   Recent Results (from the past 240 hour(s))   Basic Metabolic Panel   Result Value Ref Range    Sodium 141 136 - 145 mmol/L    Potassium 3.7 3.5 - 5.0 mmol/L    Chloride 107 98 - 107 mmol/L    CO2 26 22 - 31 mmol/L    Anion Gap, Calculation 8 5 - 18 mmol/L    Glucose 104 70 - 125 mg/dL    Calcium 8.7 8.5 - 10.5 mg/dL    BUN 15 8 - 28 mg/dL    Creatinine 1.10 0.70 - 1.30 mg/dL    GFR MDRD Af Amer >60 >60 mL/min/1.73m2    GFR MDRD Non Af Amer >60 >60 mL/min/1.73m2     Current Outpatient Medications   Medication Sig     acetaminophen (TYLENOL) 500 MG tablet Take 1,000 mg by mouth every 6 (six) hours as needed for pain.     allopurinol (ZYLOPRIM) 100 MG tablet Take 100 mg by mouth daily.     aspirin 81 mg chewable tablet Chew 1 tablet (81 mg total) daily.     atorvastatin (LIPITOR) 20 MG  tablet Take 40 mg by mouth at bedtime.            carvedilol (COREG) 3.125 MG tablet Take 3.125 mg by mouth 2 (two) times a day with meals.     fluticasone furoate-vilanterol (BREO ELLIPTA) 200-25 mcg/dose DsDv inhaler Inhale 1 puff daily.     ipratropium-albuterol (DUO-NEB) 0.5-2.5 mg/3 mL nebulizer Inhale 3 mL 4 (four) times a day as needed.     nitroglycerin (NITROSTAT) 0.4 MG SL tablet Place 0.4 mg under the tongue every 5 (five) minutes as needed for chest pain.     polyethylene glycol (MIRALAX) 17 gram packet Take 17 g by mouth daily as needed.     rivaroxaban (XARELTO) 20 mg tablet Take 20 mg by mouth daily with supper.     senna-docusate (PERICOLACE) 8.6-50 mg tablet Take 2 tablets by mouth 2 (two) times a day.     sotalol (BETAPACE) 80 MG tablet Take 40 mg by mouth at bedtime.     torsemide (DEMADEX) 10 MG tablet Take 10 mg by mouth daily as needed. Weight gain over 3 lbs or increased leg swelling     warfarin (COUMADIN) 1 MG tablet Take 1.5 tablet by mouth daily per pharmacist order.. Adjust dose based on INR results on 9/26/2016.as directed.     ASSESSMENT:      ICD-10-CM    1. Persistent atrial fibrillation (H) I48.1    2. Congestive heart failure, unspecified HF chronicity, unspecified heart failure type (H) I50.9    3. Secondary hypertension I15.9    4. Effusion of right knee M25.461        PLAN:    Atrial fibrillation- on coumadin and metoprolol  CHF- daily weights, prn torsemide   Major depression on Cymbalta  Tobacco abuse records indicate he quit 8 years ago however he restarted and declined interventions.   Hypertension- on metoprolol-stable.  Right knee effusion-pain control, ice, Scheduled tylenol, seen by orthopedic who declined tapping the knee at this time due to risk of infection- follow up if worsens. Edema significantly reduced and pain controlled.   Constipation - Pt has been refusing bowel medications - educated on taking them  Anticoagulation - On coumadin Adjust medication per INR  results.     Electronically signed by: Malina Verdin CNP

## 2021-05-30 NOTE — PROGRESS NOTES
"Code Status:  FULL CODE  Visit Type: Follow Up     Facility:  [facility] [[id number]]        Facility Type: SNF (Skilled Nursing Facility, TCU)    History of Present Illness: Chip Martin is a 73 y.o. male, who I see today for a TCU follow up visit.  He has a past medical history for CAD, MI with PTCA, ischemic cardiomyopathy with EF of 40-45%, paroxysmal afib, hypertension, dyslipidemia, CKD3, DAVID, tobacco use.  He has multiple hospitalization in the past 3 months for syncopal episodes.  Most recent being 6/2/2019-6/3/2019 in which he felt lightheaded and passed out while walking into [employer].  He has had multiple workups with unclear etiology.  It is posited to be neurocardiogenic.  He will have a MPI as an outpatient.     Today, He reports that he has smoked recently and states \"I will do what I want, so if I feel like smoking I will do that, I don't smoke that often\".  He reports therapy is going well.  He denies any dizziness or lightheadedness since being at the TCU.  His lower extremities do have +1 pitting edema bilaterally.  He is not wearing compression stockings during our visit.   He continues to have pain in his right knee and low back in which he states is stable at this time.  He continues to have right knee effusion.  He reports his BMs have been normal however he has not gone for approximately 3 days.  He refuses to take any stool meds and it not able to articulate his reasoning.  I did  him on high fiber foods including prune juice.      Review of Systems   Patient denies fever, chills, headache, lightheadedness, dizziness, rhinorrhea, cough, congestion, shortness of breath, chest pain, palpitations, abdominal pain, n/v, diarrhea, constipation, change in appetite, dysuria, frequency, burning or pain with urination.  Other than stated in HPI all other review of systems is negative.         Physical Exam   Vital signs: /78, HR 80, resp 20, temp 96.9  GENERAL " APPEARANCE: Well developed, well nourished, in no acute distress.  HEENT: normocephalic, atraumatic  PERRL, sclerae anicteric, conjunctivae clear and moist, EOM intact  LUNGS: Lung sounds CTA, no adventitious sounds, respiratory effort normal.  CARD: RRR, S1, S2, without murmurs, gallops, rubs, no JVD, ABD: Soft and nontender with normal bowel sounds.   MSK: Muscle strength and tone were normal.  EXTREMITIES:1+ pitting edema bilateral lower extremities, right knee effusion without heat or redness.   NEURO: Alert and oriented x 3.  Face is symmetric.  SKIN: Inspection of the skin reveals no rashes, ulcerations or petechiae.  PSYCH: euthymic          Labs: No results found for this or any previous visit (from the past 240 hour(s)).      Assessment:  1. History of syncope     2. Persistent atrial fibrillation (H)     3. Congestive heart failure, unspecified HF chronicity, unspecified heart failure type (H)     4. Secondary hypertension     5. Major depressive disorder, remission status unspecified, unspecified whether recurrent         Plan:     Syncope: no episodes at the TCU, continue with therapy.  Counseled on sitting down if feeling dizzy.     Afib: continue on carvedilol, and xarelto, continue with ASA for CAD and hx of MI    CHF: continue on torsemide prn for leg swelling or increase in 3lbs.     HTN: continue on carvedilol and betapace.  BPs are stable.     Depression:  I did  on mood and how this can affect his health.  He reports that he is not depressed but he is frustrated with his situation.  I explained that depressed would be natural for his condition and the unknown circumstances.     Electronically signed by: Yaneth Palmer, CNP

## 2021-06-03 VITALS — BODY MASS INDEX: 40.39 KG/M2 | WEIGHT: 281.5 LBS

## 2021-06-03 VITALS — BODY MASS INDEX: 39.27 KG/M2 | WEIGHT: 273.7 LBS

## 2021-06-03 VITALS — WEIGHT: 280.2 LBS | BODY MASS INDEX: 40.2 KG/M2

## 2021-06-04 ENCOUNTER — RECORDS - HEALTHEAST (OUTPATIENT)
Dept: ADMINISTRATIVE | Facility: CLINIC | Age: 76
End: 2021-06-04

## 2021-06-04 VITALS
DIASTOLIC BLOOD PRESSURE: 68 MMHG | HEART RATE: 65 BPM | OXYGEN SATURATION: 92 % | SYSTOLIC BLOOD PRESSURE: 140 MMHG | WEIGHT: 264.6 LBS | TEMPERATURE: 98 F | BODY MASS INDEX: 37.97 KG/M2 | RESPIRATION RATE: 18 BRPM

## 2021-06-04 VITALS
BODY MASS INDEX: 38.88 KG/M2 | DIASTOLIC BLOOD PRESSURE: 72 MMHG | SYSTOLIC BLOOD PRESSURE: 150 MMHG | OXYGEN SATURATION: 93 % | HEART RATE: 70 BPM | TEMPERATURE: 98.5 F | WEIGHT: 271 LBS | RESPIRATION RATE: 18 BRPM

## 2021-06-04 VITALS
TEMPERATURE: 98.8 F | RESPIRATION RATE: 18 BRPM | BODY MASS INDEX: 38.21 KG/M2 | DIASTOLIC BLOOD PRESSURE: 79 MMHG | WEIGHT: 266.3 LBS | SYSTOLIC BLOOD PRESSURE: 121 MMHG | HEART RATE: 68 BPM | OXYGEN SATURATION: 92 %

## 2021-06-04 VITALS
RESPIRATION RATE: 18 BRPM | SYSTOLIC BLOOD PRESSURE: 117 MMHG | TEMPERATURE: 99 F | WEIGHT: 268.8 LBS | BODY MASS INDEX: 38.57 KG/M2 | HEART RATE: 78 BPM | DIASTOLIC BLOOD PRESSURE: 74 MMHG | OXYGEN SATURATION: 95 %

## 2021-06-06 NOTE — PROGRESS NOTES
[facility]    Facility:   [facility] [[id number]]   Code Status: FULL CODE      CHIEF COMPLAINT/REASON FOR VISIT:  Chief Complaint   Patient presents with     Problem Visit     shingles       HISTORY:      HPI: Chip is a 74 y.o. male undergoing physical and occupational therapy at [facility] transitional care unit. He is with PMHx of CAD, MI s/p BMS x2 to LAD and PTCA to D1 (2012), ischemic cardiomyopathy (LVEF 40-45%), paroxysmal atrial flutter (on sotalol and Xarelto), hypertension, dyslipidemia, CKD 3, DAVID (intolerant to CPAP) further records on 2/25/2020 patient was [place] the bank going back and forth and then sat down to rest on his walker because he felt off had a headache and weakness.  He slipped off his walker and fell on his ankle and then hit his knee.  He was unable to get up and was brought in by EMS complaining of right knee and ankle pain.  Of note he is with chronic back pain.  In the emergency department his vitals were hypotensive and he was given a liter of IV fluids he also was noted to have a low hemoglobin of 6.7 and imaging showed a fracture distal right fibula currently he is in a splint and he will follow-up with orthopedics tomorrow.    Today he is seen for reports of worsening rash.  Patient was noted to have an old rash on the back of his neck when I first met with him.  He reported he had the rash for approximately 2 weeks and it was scabbed over and was not linear.  Today rash with blistering and has spread to the left side of his shoulder towards the front of his neck and was weeping.  It did appear to look like shingles and he was started on acyclovir  and his triamcinolone was discontinued.  He denies chest pain shortness of breath.  He is moving his bowels.  He  denies cough congestion.   He denies any numbness or tingling he is able to wiggle his toes and can feel touch.  Past Medical History:   Diagnosis Date     Acute on  chronic anemia      ALEX (acute kidney injury) (H)      Atrial fibrillation (H)      CAD (coronary artery disease)      Chronic bronchitis (H)      Chronic hypercapnic respiratory failure (H)      Chronic lower back pain      Chronic systolic heart failure (H)      CKD (chronic kidney disease)      Closed right fibular fracture     distal end     COPD (chronic obstructive pulmonary disease) (H)      Depressive disorder      ED (erectile dysfunction)      HLD (hyperlipidemia)      Hypertension      Hypertrophy of prostate without urinary obstruction      Ischemic cardiomyopathy      Knee effusion, right      Lumbago      Opiate abuse, continuous (H)      DAVID (obstructive sleep apnea)      Prostate cancer (H)      Syncope      Syncope      Tobacco use disorder      Vitamin D deficiency              Family History   Problem Relation Age of Onset     Heart disease Father      Heart disease Brother      Social History     Socioeconomic History     Marital status:      Spouse name: Not on file     Number of children: Not on file     Years of education: Not on file     Highest education level: Not on file   Occupational History     Not on file   Social Needs     Financial resource strain: Not on file     Food insecurity:     Worry: Not on file     Inability: Not on file     Transportation needs:     Medical: Not on file     Non-medical: Not on file   Tobacco Use     Smoking status: Former Smoker     Packs/day: 2.00     Years: 50.00     Pack years: 100.00     Types: Cigarettes     Last attempt to quit: 2011     Years since quittin.1     Smokeless tobacco: Never Used   Substance and Sexual Activity     Alcohol use: No     Drug use: No     Sexual activity: Never     Partners: Female   Lifestyle     Physical activity:     Days per week: Not on file     Minutes per session: Not on file     Stress: Not on file   Relationships     Social connections:     Talks on phone: Not on file     Gets together: Not on file      Attends Christian service: Not on file     Active member of club or organization: Not on file     Attends meetings of clubs or organizations: Not on file     Relationship status: Not on file     Intimate partner violence:     Fear of current or ex partner: Not on file     Emotionally abused: Not on file     Physically abused: Not on file     Forced sexual activity: Not on file   Other Topics Concern     Not on file   Social History Narrative     Not on file         Review of Systems   Constitutional: Positive for activity change. Negative for appetite change, chills, fatigue and fever.   HENT: Negative for congestion and sore throat.    Eyes: Negative for visual disturbance.   Respiratory: Negative for cough, shortness of breath and wheezing.    Cardiovascular: Negative for chest pain and leg swelling.   Gastrointestinal: Negative for abdominal distention, abdominal pain, constipation, diarrhea and nausea.   Genitourinary: Negative for dysuria.   Musculoskeletal: Negative for arthralgias and myalgias.   Skin: Negative for color change, rash and wound.        Rash with blistering posterior and neck left shoulder   Neurological: Negative for dizziness, weakness and numbness.   Psychiatric/Behavioral: Negative for agitation, behavioral problems and sleep disturbance.       Vitals:    03/04/20 0951   BP: 117/74   Pulse: 78   Resp: 18   Temp: 99  F (37.2  C)   SpO2: 95%   Weight: (!) 268 lb 12.8 oz (121.9 kg)       Physical Exam  Constitutional:       Appearance: He is well-developed.      Comments: Pleasant gentleman in no acute distress   HENT:      Head: Normocephalic.   Eyes:      Conjunctiva/sclera: Conjunctivae normal.   Neck:      Musculoskeletal: Normal range of motion.   Cardiovascular:      Rate and Rhythm: Normal rate and regular rhythm.      Heart sounds: Normal heart sounds. No murmur.   Pulmonary:      Effort: No respiratory distress.      Breath sounds: Normal breath sounds. No wheezing or rales.    Abdominal:      General: Bowel sounds are normal. There is no distension.      Palpations: Abdomen is soft.      Tenderness: There is no abdominal tenderness.   Musculoskeletal: Normal range of motion.      Comments: Splint right foot follow-up with orthopedics   Skin:     General: Skin is warm.   Neurological:      Mental Status: He is alert and oriented to person, place, and time.   Psychiatric:         Behavior: Behavior normal.           LABS:   No results found for this or any previous visit (from the past 240 hour(s)).  Current Outpatient Medications   Medication Sig     acetaminophen (TYLENOL) 500 MG tablet Take 1,000 mg by mouth 3 (three) times a day.      atorvastatin (LIPITOR) 20 MG tablet Take 40 mg by mouth at bedtime.            ferrous sulfate 325 (65 FE) MG tablet Take 1 tablet by mouth daily with breakfast.     lisinopriL (PRINIVIL,ZESTRIL) 2.5 MG tablet Take 2.5 mg by mouth daily.     metoprolol succinate (TOPROL-XL) 25 MG Take 12.5 mg by mouth daily.     nitroglycerin (NITROSTAT) 0.4 MG SL tablet Place 0.4 mg under the tongue every 5 (five) minutes as needed for chest pain.     omeprazole (PRILOSEC) 20 MG capsule Take 20 mg by mouth daily before breakfast.     rivaroxaban (XARELTO) 20 mg tablet Take 20 mg by mouth daily with supper.     torsemide (DEMADEX) 10 MG tablet Take 10 mg by mouth daily as needed. Weight gain over 3 lbs or increased leg swelling     ASSESSMENT:      ICD-10-CM    1. Herpes zoster with complication B02.8        PLAN:    Atrial fibrillation on metoprolol, Xarelto    Congestive heart failure on Xarelto, Metoprolol, daily weights furosemide as needed    Shingles Acyclovir as ordered    Distal fracture of right fibula patient currently in a splint will follow up with orthopedics on 3/3/2020, pain control, monitor for CMS changes    Pain control currently denies continue Tylenol 3 times daily    Anemia on ferrous sulfate hemoglobin on 2/27 7.8 up from 6.9    Hypertension on  lisinopril and Toprol-XL    GERD continue omeprazole    Hyperlipidemia on atorvastatin  .    Electronically signed by: Malina Verdin CNP

## 2021-06-06 NOTE — PROGRESS NOTES
"[facility]      Facility:    [facility] [[id number]]  Code Status: FULL CODE       Chief Complaint/Reason for Visit:  Chief Complaint   Patient presents with     H & P     Admit note to TCU for a fall with right fibula fracture.        HPI:   Chip is a 74 y.o. male with hx of atrial flutter on xarelto, CAD, CM, CHF, CKD, HTN, admitted to the hospital on 2/25/20 after a fall with right ankle pain.     HISTORY OF PRESENT ILLNESS: Chip Martin is a 74 y.o. male with a history as noted who had a fall the AM of admission and now presenting with ankle pain.  Patient has not felt himself this last week, attributes most of that to back pain (which is chronic for many years), but also feeling fatigued. He gets worsening weakness/deconditioning and dyspnea when he exerts himself. [place] at the bank going back and forth for a few things then sat down to rest on his walker when he felt \"off\", with a headache, weakness. He slipped off his walker and felt on his ankle and then hit his knee. He was unable to get up, was brought in by EMS complaining of right knee and ankle pain. Was lethargic initially, this improved. He denies alcohol and drug use. Denies prodromal symptoms (ie palpitations, lightheadedness, dizziness, orthostasis, chest pain), LOC/head trauma, sick contacts/prior illness/recent travel. Currently he has pain in his back \"I've seen all the pain docs here and they all hate me\" and right ankle. In the ED his vitals were hypotensive (responded to 1L IVF). His labs showed Hgb 6.7. Imaging showed fractured distal right fibula. Ortho consulted in the ED. He was admitted for further management and evaluation.     Hospital Course:   Chip Martin is a 74 y.o. male with a history of chronic kidney disease, hypertension, obstructive sleep apnea, atrial flutter on Xarelto, chronic systolic CHF who had a fall the AM of admission and now presenting with ankle pain. See H&P for " full details (From 2/25/20). Imaging showed a distal fracture of right fibula. Due to his fall, ankle fracture and anticoagulation state he was admitted. CT head negative for bleed; initial hypotension improved with fluids. Ortho consulted who recommend non-surgical interventions. PT/OT recommend TCU and wheelchair due to his NWB status and obesity/debility making it difficult to be NWB with his walker. Hgb was noted to be 6.7 on admission, MCV very low; Ferritin checked due to his history of Fe deficiency anemia and came back at 3.0. He was given IV Fe 2/26/20 then started on PO replacement. He will follow up with Orthopedics in 1 week. He was discharged to TCU in stable condition on 2/28/2020 and was in agreement with the above plan.     Overall stabilized and discharged to TCU on 2/28/20 for PT, OT, nursing cares, medical management and monitoring.     Today:  He is in a cast for RLE, orders for TTWB. He will see ortho for follow up next week. Has no pain in ankle at this time. He is otherwise doing well. Denies cough, congestion, fever. No dizziness or palpitations. No shortness of breath or chest pain. Appetite is good. No diarrhea or constipation. He is hopeful to go home soon, lives in an apt. No new vision or hearing problems.       Past Medical History:  Past Medical History:   Diagnosis Date     Acute on chronic anemia      ALEX (acute kidney injury) (H)      Atrial fibrillation (H)      CAD (coronary artery disease)      Chronic bronchitis (H)      Chronic hypercapnic respiratory failure (H)      Chronic lower back pain      Chronic systolic heart failure (H)      CKD (chronic kidney disease)      Closed right fibular fracture     distal end     COPD (chronic obstructive pulmonary disease) (H)      Depressive disorder      ED (erectile dysfunction)      HLD (hyperlipidemia)      Hypertension      Hypertrophy of prostate without urinary obstruction      Ischemic cardiomyopathy      Knee effusion, right       Lumbago      Opiate abuse, continuous (H)      DAVID (obstructive sleep apnea)      Prostate cancer (H)      Syncope      Syncope      Tobacco use disorder      Vitamin D deficiency            Surgical History:  Past Surgical History:   Procedure Laterality Date     BACK SURGERY       CORONARY ANGIOPLASTY       LAPAROSCOPIC CHOLECYSTECTOMY  2016     PROSTATE BIOPSY         Family History:   Family History   Problem Relation Age of Onset     Heart disease Father      Heart disease Brother        Social History:    Social History     Socioeconomic History     Marital status:      Spouse name: Not on file     Number of children: Not on file     Years of education: Not on file     Highest education level: Not on file   Occupational History     Not on file   Social Needs     Financial resource strain: Not on file     Food insecurity     Worry: Not on file     Inability: Not on file     Transportation needs     Medical: Not on file     Non-medical: Not on file   Tobacco Use     Smoking status: Former Smoker     Packs/day: 2.00     Years: 50.00     Pack years: 100.00     Types: Cigarettes     Last attempt to quit: 2011     Years since quittin.1     Smokeless tobacco: Never Used   Substance and Sexual Activity     Alcohol use: No     Drug use: No     Sexual activity: Never     Partners: Female   Lifestyle     Physical activity     Days per week: Not on file     Minutes per session: Not on file     Stress: Not on file   Relationships     Social connections     Talks on phone: Not on file     Gets together: Not on file     Attends Restorationism service: Not on file     Active member of club or organization: Not on file     Attends meetings of clubs or organizations: Not on file     Relationship status: Not on file     Intimate partner violence     Fear of current or ex partner: Not on file     Emotionally abused: Not on file     Physically abused: Not on file     Forced sexual activity: Not on file   Other  "Topics Concern     Not on file   Social History Narrative     Not on file       Medications:  Current Outpatient Medications   Medication Sig     acetaminophen (TYLENOL) 500 MG tablet Take 1,000 mg by mouth 3 (three) times a day.      atorvastatin (LIPITOR) 20 MG tablet Take 40 mg by mouth at bedtime.            ferrous sulfate 325 (65 FE) MG tablet Take 1 tablet by mouth daily with breakfast.     lisinopriL (PRINIVIL,ZESTRIL) 2.5 MG tablet Take 2.5 mg by mouth daily.     metoprolol succinate (TOPROL-XL) 25 MG Take 12.5 mg by mouth daily.     nitroglycerin (NITROSTAT) 0.4 MG SL tablet Place 0.4 mg under the tongue every 5 (five) minutes as needed for chest pain.     omeprazole (PRILOSEC) 20 MG capsule Take 20 mg by mouth daily before breakfast.     rivaroxaban (XARELTO) 20 mg tablet Take 20 mg by mouth daily with supper.     torsemide (DEMADEX) 10 MG tablet Take 10 mg by mouth daily as needed. Weight gain over 3 lbs or increased leg swelling       Allergies:  Allergies   Allergen Reactions     Blood-Group Specific Substance      Patient has Anti-Big E antibody.  Blood product orders may be delayed.  Please draw one red top and two lavender top tubes for all Type and Screen/Type and Crossmatch orders.     Gadopentetate Dimeglumine      Other reaction(s): Syncope  \"on the floor\", states that he passed out     Gadoversetamide      Other reaction(s): Syncope  \"on the floor\", states that he passed out     Iodinated Contrast Media      Syncope          Review of Systems:  Pertinent items are noted in HPI.      Physical Exam:   General: Patient is alert male, no distress.   Vitals: /84, Temp 98.8, Pulse 84, RR 20, O2 sat 94%RA.  HEENT: Head is NCAT. Eyes show no injection or icterus. Nares negative. Oropharynx well hydrated.  Neck: Supple. No tenderness or adenopathy. No JVD.  Lungs: Clear bilaterally. No wheezes.  Cardiovascular: Regular rate and rhythm, normal S1, S2.  Back: No spinal or CVA tenderness.  Abdomen: " Obese, soft, no tenderness on exam. Bowel sounds present. No guarding rebound or rigidity.  : Deferred.  Extremities: Right LE cast.    Musculoskeletal: Degen changes.   Skin: Warm and dry.  Psych: Mood appears good.      Labs:  Lab Results   Component Value Date    WBC 7.6 09/09/2016    HGB 16.8 09/09/2016    HCT 50.1 09/09/2016    MCV 90 09/09/2016     09/09/2016     Results for orders placed or performed in visit on 06/21/19   Basic Metabolic Panel   Result Value Ref Range    Sodium 141 136 - 145 mmol/L    Potassium 3.7 3.5 - 5.0 mmol/L    Chloride 107 98 - 107 mmol/L    CO2 26 22 - 31 mmol/L    Anion Gap, Calculation 8 5 - 18 mmol/L    Glucose 104 70 - 125 mg/dL    Calcium 8.7 8.5 - 10.5 mg/dL    BUN 15 8 - 28 mg/dL    Creatinine 1.10 0.70 - 1.30 mg/dL    GFR MDRD Af Amer >60 >60 mL/min/1.73m2    GFR MDRD Non Af Amer >60 >60 mL/min/1.73m2       Assessment/Plan:  1. Right distal fibula fracture. Non operative management, in a cast. Initially NWB, now TTWB after 3/3/20 ortho visit. Follow up with ortho again on 3/17/20.  2. Atrial flutter. He is on xarelto. Adequate rate control with metoprolol.  3. HTN. Also on lisinopril and diuretics. Monitor BPs in TCU.  4. CHD, CM, hx CAD. Continue torsemide, statin. Possible syncopal episode contributing to fall. Follow up with cardiology.  5. Anemia. Hgb 6.7 upon hospital admit. Received IV iron, continue oral. Follow up post TCU with PMD for recheck and further considerations of chronic anemia.  6. CKD. With ALEX in the hospital.   7. Chronic back pain.  8. Code status is full code.          Electronically signed by: Michaela Navarro MD

## 2021-06-06 NOTE — PROGRESS NOTES
[facility]    Facility:   [facility] [[id number]]   Code Status: FULL CODE      CHIEF COMPLAINT/REASON FOR VISIT:  Chief Complaint   Patient presents with     Problem Visit     gout       HISTORY:      HPI: Chip is a 74 y.o. male undergoing physical and occupational therapy at [facility] transitional care unit. He is with PMHx of CAD, MI s/p BMS x2 to LAD and PTCA to D1 (2012), ischemic cardiomyopathy (LVEF 40-45%), paroxysmal atrial flutter (on sotalol and Xarelto), hypertension, dyslipidemia, CKD 3, DAVID (intolerant to CPAP) further records on 2/25/2020 patient was at the bank going back and forth and then sat down to rest on his walker because he felt off had a headache and weakness.  He slipped off his walker and fell on his ankle and then hit his knee.  He was unable to get up and was brought in by EMS complaining of right knee and ankle pain.  Of note he is with chronic back pain.  In the emergency department his vitals were hypotensive and he was given a liter of IV fluids he also was noted to have a low hemoglobin of 6.7 and imaging showed a fracture distal right fibula currently he is in a splint and he will follow-up with orthopedics tomorrow.    Today he is seen for reports of pain left lateral toe. He was found to have redness left toe and reported positive history of gout. His pain was interfering with his therapy because he has weight restriction RLE.   He denies chest pain shortness of breath.  He is moving his bowels.  He  denies cough congestion.   He denies any numbness or tingling he is able to wiggle his toes and can feel touch.  Past Medical History:   Diagnosis Date     Acute on chronic anemia      ALEX (acute kidney injury) (H)      Atrial fibrillation (H)      CAD (coronary artery disease)      Chronic bronchitis (H)      Chronic hypercapnic respiratory failure (H)      Chronic lower back pain      Chronic systolic heart failure (H)       CKD (chronic kidney disease)      Closed right fibular fracture     distal end     COPD (chronic obstructive pulmonary disease) (H)      Depressive disorder      ED (erectile dysfunction)      HLD (hyperlipidemia)      Hypertension      Hypertrophy of prostate without urinary obstruction      Ischemic cardiomyopathy      Knee effusion, right      Lumbago      Opiate abuse, continuous (H)      DAVID (obstructive sleep apnea)      Prostate cancer (H)      Syncope      Syncope      Tobacco use disorder      Vitamin D deficiency              Family History   Problem Relation Age of Onset     Heart disease Father      Heart disease Brother      Social History     Socioeconomic History     Marital status:      Spouse name: Not on file     Number of children: Not on file     Years of education: Not on file     Highest education level: Not on file   Occupational History     Not on file   Social Needs     Financial resource strain: Not on file     Food insecurity:     Worry: Not on file     Inability: Not on file     Transportation needs:     Medical: Not on file     Non-medical: Not on file   Tobacco Use     Smoking status: Former Smoker     Packs/day: 2.00     Years: 50.00     Pack years: 100.00     Types: Cigarettes     Last attempt to quit: 2011     Years since quittin.1     Smokeless tobacco: Never Used   Substance and Sexual Activity     Alcohol use: No     Drug use: No     Sexual activity: Never     Partners: Female   Lifestyle     Physical activity:     Days per week: Not on file     Minutes per session: Not on file     Stress: Not on file   Relationships     Social connections:     Talks on phone: Not on file     Gets together: Not on file     Attends Judaism service: Not on file     Active member of club or organization: Not on file     Attends meetings of clubs or organizations: Not on file     Relationship status: Not on file     Intimate partner violence:     Fear of current or ex partner:  Not on file     Emotionally abused: Not on file     Physically abused: Not on file     Forced sexual activity: Not on file   Other Topics Concern     Not on file   Social History Narrative     Not on file         Review of Systems   Constitutional: Positive for activity change. Negative for appetite change, chills, fatigue and fever.   HENT: Negative for congestion and sore throat.    Eyes: Negative for visual disturbance.   Respiratory: Negative for cough, shortness of breath and wheezing.    Cardiovascular: Negative for chest pain and leg swelling.   Gastrointestinal: Negative for abdominal distention, abdominal pain, constipation, diarrhea and nausea.   Genitourinary: Negative for dysuria.   Musculoskeletal: Negative for arthralgias and myalgias.   Skin: Negative for color change, rash and wound.        Rash with blistering posterior and neck left shoulder   Neurological: Negative for dizziness, weakness and numbness.   Psychiatric/Behavioral: Negative for agitation, behavioral problems and sleep disturbance.       Vitals:    03/06/20 1049   BP: 121/79   Pulse: 68   Resp: 18   Temp: 98.8  F (37.1  C)   SpO2: 92%   Weight: (!) 266 lb 4.8 oz (120.8 kg)       Physical Exam  Constitutional:       Appearance: He is well-developed.      Comments: Pleasant gentleman in no acute distress   HENT:      Head: Normocephalic.   Eyes:      Conjunctiva/sclera: Conjunctivae normal.   Neck:      Musculoskeletal: Normal range of motion.   Cardiovascular:      Rate and Rhythm: Normal rate and regular rhythm.      Heart sounds: Normal heart sounds. No murmur.   Pulmonary:      Effort: No respiratory distress.      Breath sounds: Normal breath sounds. No wheezing or rales.   Abdominal:      General: Bowel sounds are normal. There is no distension.      Palpations: Abdomen is soft.      Tenderness: There is no abdominal tenderness.   Musculoskeletal: Normal range of motion.      Comments: Splint right foot follow-up with orthopedics    Skin:     General: Skin is warm.   Neurological:      Mental Status: He is alert and oriented to person, place, and time.   Psychiatric:         Behavior: Behavior normal.           LABS:   No results found for this or any previous visit (from the past 240 hour(s)).  Current Outpatient Medications   Medication Sig     acetaminophen (TYLENOL) 500 MG tablet Take 1,000 mg by mouth 3 (three) times a day.      atorvastatin (LIPITOR) 20 MG tablet Take 40 mg by mouth at bedtime.            ferrous sulfate 325 (65 FE) MG tablet Take 1 tablet by mouth daily with breakfast.     lisinopriL (PRINIVIL,ZESTRIL) 2.5 MG tablet Take 2.5 mg by mouth daily.     metoprolol succinate (TOPROL-XL) 25 MG Take 12.5 mg by mouth daily.     nitroglycerin (NITROSTAT) 0.4 MG SL tablet Place 0.4 mg under the tongue every 5 (five) minutes as needed for chest pain.     omeprazole (PRILOSEC) 20 MG capsule Take 20 mg by mouth daily before breakfast.     rivaroxaban (XARELTO) 20 mg tablet Take 20 mg by mouth daily with supper.     torsemide (DEMADEX) 10 MG tablet Take 10 mg by mouth daily as needed. Weight gain over 3 lbs or increased leg swelling     ASSESSMENT:      ICD-10-CM    1. Acute gout involving toe of left foot, unspecified cause M10.9        PLAN:    Gout- colchicine 1.2 mg x 1 followed by 0.6mg 1 hour later.     Atrial fibrillation on metoprolol, Xarelto    Congestive heart failure on Xarelto, Metoprolol, daily weights furosemide as needed    Shingles Acyclovir as ordered    Distal fracture of right fibula patient currently in a splint will follow up with orthopedics on 3/3/2020, pain control, monitor for CMS changes    Pain control currently denies continue Tylenol 3 times daily    Anemia on ferrous sulfate hemoglobin on 2/27 7.8 up from 6.9    Hypertension on lisinopril and Toprol-XL    GERD continue omeprazole    Hyperlipidemia on atorvastatin  .    Electronically signed by: Malina Verdin CNP

## 2021-06-06 NOTE — PROGRESS NOTES
[facility]    Facility:   [facility] [[id number]]   Code Status: FULL CODE      CHIEF COMPLAINT/REASON FOR VISIT:  Chief Complaint   Patient presents with     Review Of Multiple Medical Conditions       HISTORY:      HPI: Chip is a 74 y.o. male undergoing physical and occupational therapy at [facility] transitional care unit. He is with PMHx of CAD, MI s/p BMS x2 to LAD and PTCA to D1 (2012), ischemic cardiomyopathy (LVEF 40-45%), paroxysmal atrial flutter (on sotalol and Xarelto), hypertension, dyslipidemia, CKD 3, DAVID (intolerant to CPAP) further records on 2/25/2020 patient was at the bank going back and forth and then sat down to rest on his walker because he felt off had a headache and weakness.  He slipped off his walker and fell on his ankle and then hit his knee.  He was unable to get up and was brought in by EMS complaining of right knee and ankle pain.  Of note he is with chronic back pain.  In the emergency department his vitals were hypotensive and he was given a liter of IV fluids he also was noted to have a low hemoglobin of 6.7 and imaging showed a fracture distal right fibula currently he is in a splint and he will follow-up with orthopedics tomorrow.    Today he is seen for a first routine visit to review multiple medical issues.  He denies chest pain shortness of breath denies constipation or diarrhea and then reported he does get constipated at times and may in a few days. He  denies cough congestion.  His weights were reviewed and he is down 4 pounds in the last 5 days, he does have PRN torsemide for weight gain.  Was noted to have a rash in the back of his neck.  The rash did appear to be scabbed over and he tells me he has had approximately 2 weeks and does not know how he got it.  The rash was not linear.  He continues to tell me he broke a back scratcher by scratching it so much.  I did order him some triamcinolone ointment twice  daily until healed.  Is currently being a splint right foot.  He denies any numbness or tingling he is able to wiggle his toes and can feel touch.  Past Medical History:   Diagnosis Date     Acute on chronic anemia      ALEX (acute kidney injury) (H)      Atrial fibrillation (H)      CAD (coronary artery disease)      Chronic bronchitis (H)      Chronic hypercapnic respiratory failure (H)      Chronic lower back pain      Chronic systolic heart failure (H)      CKD (chronic kidney disease)      Closed right fibular fracture     distal end     COPD (chronic obstructive pulmonary disease) (H)      Depressive disorder      ED (erectile dysfunction)      HLD (hyperlipidemia)      Hypertension      Hypertrophy of prostate without urinary obstruction      Ischemic cardiomyopathy      Knee effusion, right      Lumbago      Opiate abuse, continuous (H)      DAVID (obstructive sleep apnea)      Prostate cancer (H)      Syncope      Syncope      Tobacco use disorder      Vitamin D deficiency              Family History   Problem Relation Age of Onset     Heart disease Father      Heart disease Brother      Social History     Socioeconomic History     Marital status:      Spouse name: Not on file     Number of children: Not on file     Years of education: Not on file     Highest education level: Not on file   Occupational History     Not on file   Social Needs     Financial resource strain: Not on file     Food insecurity:     Worry: Not on file     Inability: Not on file     Transportation needs:     Medical: Not on file     Non-medical: Not on file   Tobacco Use     Smoking status: Former Smoker     Packs/day: 2.00     Years: 50.00     Pack years: 100.00     Types: Cigarettes     Last attempt to quit: 2011     Years since quittin.1     Smokeless tobacco: Never Used   Substance and Sexual Activity     Alcohol use: No     Drug use: No     Sexual activity: Never     Partners: Female   Lifestyle     Physical  activity:     Days per week: Not on file     Minutes per session: Not on file     Stress: Not on file   Relationships     Social connections:     Talks on phone: Not on file     Gets together: Not on file     Attends Christian service: Not on file     Active member of club or organization: Not on file     Attends meetings of clubs or organizations: Not on file     Relationship status: Not on file     Intimate partner violence:     Fear of current or ex partner: Not on file     Emotionally abused: Not on file     Physically abused: Not on file     Forced sexual activity: Not on file   Other Topics Concern     Not on file   Social History Narrative     Not on file         Review of Systems   Constitutional: Negative for activity change, appetite change, chills, fatigue and fever.   HENT: Negative for congestion and sore throat.    Eyes: Negative for visual disturbance.   Respiratory: Negative for cough, shortness of breath and wheezing.    Cardiovascular: Negative for chest pain and leg swelling.   Gastrointestinal: Negative for abdominal distention, abdominal pain, constipation, diarrhea and nausea.   Genitourinary: Negative for dysuria.   Musculoskeletal: Negative for arthralgias and myalgias.   Skin: Negative for color change, rash and wound.   Neurological: Negative for dizziness, weakness and numbness.   Psychiatric/Behavioral: Negative for agitation, behavioral problems and sleep disturbance.       Vitals:    03/02/20 1005   BP: 150/72   Pulse: 70   Resp: 18   Temp: 98.5  F (36.9  C)   SpO2: 93%   Weight: (!) 271 lb (122.9 kg)       Physical Exam  Constitutional:       Appearance: He is well-developed.   HENT:      Head: Normocephalic.   Eyes:      Conjunctiva/sclera: Conjunctivae normal.   Neck:      Musculoskeletal: Normal range of motion.   Cardiovascular:      Rate and Rhythm: Normal rate and regular rhythm.      Heart sounds: Normal heart sounds. No murmur.   Pulmonary:      Effort: No respiratory distress.       Breath sounds: Normal breath sounds. No wheezing or rales.   Abdominal:      General: Bowel sounds are normal. There is no distension.      Palpations: Abdomen is soft.      Tenderness: There is no abdominal tenderness.   Musculoskeletal: Normal range of motion.   Skin:     General: Skin is warm.   Neurological:      Mental Status: He is alert and oriented to person, place, and time.   Psychiatric:         Behavior: Behavior normal.           LABS:   No results found for this or any previous visit (from the past 240 hour(s)).  Current Outpatient Medications   Medication Sig     acetaminophen (TYLENOL) 500 MG tablet Take 1,000 mg by mouth 3 (three) times a day.      atorvastatin (LIPITOR) 20 MG tablet Take 40 mg by mouth at bedtime.            ferrous sulfate 325 (65 FE) MG tablet Take 1 tablet by mouth daily with breakfast.     lisinopriL (PRINIVIL,ZESTRIL) 2.5 MG tablet Take 2.5 mg by mouth daily.     metoprolol succinate (TOPROL-XL) 25 MG Take 12.5 mg by mouth daily.     nitroglycerin (NITROSTAT) 0.4 MG SL tablet Place 0.4 mg under the tongue every 5 (five) minutes as needed for chest pain.     omeprazole (PRILOSEC) 20 MG capsule Take 20 mg by mouth daily before breakfast.     rivaroxaban (XARELTO) 20 mg tablet Take 20 mg by mouth daily with supper.     torsemide (DEMADEX) 10 MG tablet Take 10 mg by mouth daily as needed. Weight gain over 3 lbs or increased leg swelling     ASSESSMENT:      ICD-10-CM    1. Persistent atrial fibrillation I48.19    2. Congestive heart failure, unspecified HF chronicity, unspecified heart failure type (H) I50.9    3. Rash R21    4. Closed fracture of distal end of right fibula with routine healing, unspecified fracture morphology, subsequent encounter S82.214I        PLAN:    Atrial fibrillation on metoprolol, Xarelto    Congestive heart failure on Xarelto, Metoprolol, daily weights furosemide as needed    Rash-triamcinolone ointment twice daily until healed posterior  neck    Distal fracture of right fibula patient currently in a splint will follow up with orthopedics on 3/3/2020, pain control, monitor for CMS changes    Pain control currently denies continue Tylenol 3 times daily    Anemia on ferrous sulfate hemoglobin on 2/27 7.8 up from 6.9    Hypertension on lisinopril and Toprol-XL    GERD continue omeprazole    Hyperlipidemia on atorvastatin  .    Electronically signed by: Malina Verdin CNP

## 2021-06-06 NOTE — PROGRESS NOTES
"[facility]    Facility:   [facility] [[id number]]   Code Status: FULL CODE       Chief Complaint   Patient presents with     Follow Up     TCU 3/17/20.       HPI:   Chip is a 74 y.o. male with hx of atrial flutter on xarelto, CAD, CM, CHF, CKD, HTN, admitted to the hospital on 2/25/20 after a fall with right ankle pain.     HISTORY OF PRESENT ILLNESS: Chip Martin is a 74 y.o. male with a history as noted who had a fall the AM of admission and now presenting with ankle pain.  Patient has not felt himself this last week, attributes most of that to back pain (which is chronic for many years), but also feeling fatigued. He gets worsening weakness/deconditioning and dyspnea when he exerts himself. Was at the bank going back and forth for a few things then sat down to rest on his walker when he felt \"off\", with a headache, weakness. He slipped off his walker and felt on his ankle and then hit his knee. He was unable to get up, was brought in by EMS complaining of right knee and ankle pain. Was lethargic initially, this improved. He denies alcohol and drug use. Denies prodromal symptoms (ie palpitations, lightheadedness, dizziness, orthostasis, chest pain), LOC/head trauma, sick contacts/prior illness/recent travel. Currently he has pain in his back \"I've seen all the pain docs here and they all hate me\" and right ankle. In the ED his vitals were hypotensive (responded to 1L IVF). His labs showed Hgb 6.7. Imaging showed fractured distal right fibula. Ortho consulted in the ED. He was admitted for further management and evaluation.     Hospital Course:   Chip Martin is a 74 y.o. male with a history of chronic kidney disease, hypertension, obstructive sleep apnea, atrial flutter on Xarelto, chronic systolic CHF who had a fall the AM of admission and now presenting with ankle pain. See H&P for full details (From 2/25/20). Imaging showed a distal fracture of right fibula. " Due to his fall, ankle fracture and anticoagulation state he was admitted. CT head negative for bleed; initial hypotension improved with fluids. Ortho consulted who recommend non-surgical interventions. PT/OT recommend TCU and wheelchair due to his NWB status and obesity/debility making it difficult to be NWB with his walker. Hgb was noted to be 6.7 on admission, MCV very low; Ferritin checked due to his history of Fe deficiency anemia and came back at 3.0. He was given IV Fe 2/26/20 then started on PO replacement. He will follow up with Orthopedics in 1 week. He was discharged to TCU in stable condition on 2/28/2020 and was in agreement with the above plan.     Overall stabilized and discharged to TCU on 2/28/20 for PT, OT, nursing cares, medical management and monitoring.     Today:  He went to see ortho today, cast was removed right LE and he is now in a CAM, orders to have it on all the time except for three times a day ROM and for hygiene. He can WBAT with the boot. Today he is mostly bothered by his chronic back pain, no new concerns, just chronic. He denies pain in right ankle. He is otherwise doing okay. Denies cough, congestion, fever. No dizziness or palpitations. No shortness of breath or chest pain. Appetite is good. No diarrhea or constipation. He is hopeful to go home soon, has a car conference this afternoon.      Past Medical History:   Past Medical History:   Diagnosis Date     Acute on chronic anemia      ALEX (acute kidney injury) (H)      Atrial fibrillation (H)      CAD (coronary artery disease)      Chronic bronchitis (H)      Chronic hypercapnic respiratory failure (H)      Chronic lower back pain      Chronic systolic heart failure (H)      CKD (chronic kidney disease)      Closed right fibular fracture     distal end     COPD (chronic obstructive pulmonary disease) (H)      Depressive disorder      ED (erectile dysfunction)      HLD (hyperlipidemia)      Hypertension      Hypertrophy of  prostate without urinary obstruction      Ischemic cardiomyopathy      Knee effusion, right      Lumbago      Opiate abuse, continuous (H)      DAVID (obstructive sleep apnea)      Prostate cancer (H)      Syncope      Syncope      Tobacco use disorder      Vitamin D deficiency        Medications:  Current Outpatient Medications   Medication Sig     acetaminophen (TYLENOL) 500 MG tablet Take 1,000 mg by mouth 3 (three) times a day.      atorvastatin (LIPITOR) 20 MG tablet Take 40 mg by mouth at bedtime.            ferrous sulfate 325 (65 FE) MG tablet Take 1 tablet by mouth daily with breakfast.     lisinopriL (PRINIVIL,ZESTRIL) 2.5 MG tablet Take 2.5 mg by mouth daily.     metoprolol succinate (TOPROL-XL) 25 MG Take 12.5 mg by mouth daily.     nitroglycerin (NITROSTAT) 0.4 MG SL tablet Place 0.4 mg under the tongue every 5 (five) minutes as needed for chest pain.     omeprazole (PRILOSEC) 20 MG capsule Take 20 mg by mouth daily before breakfast.     rivaroxaban (XARELTO) 20 mg tablet Take 20 mg by mouth daily with supper.     torsemide (DEMADEX) 10 MG tablet Take 10 mg by mouth daily as needed. Weight gain over 3 lbs or increased leg swelling       Physical Exam:   General: Patient is alert male, no distress.   Vitals: /69, Temp 98, Pulse 86, RR 16, O2 sat 92%RA.  HEENT: Head is NCAT. Eyes show no injection or icterus. Nares negative. Oropharynx well hydrated.  Abdomen: Obese.  Extremities: Right LE CAM boot.    Musculoskeletal: Degen changes.   Skin: Warm and dry.  Psych: Mood appears pretty good.      Labs:  Results for orders placed or performed in visit on 06/21/19   Basic Metabolic Panel   Result Value Ref Range    Sodium 141 136 - 145 mmol/L    Potassium 3.7 3.5 - 5.0 mmol/L    Chloride 107 98 - 107 mmol/L    CO2 26 22 - 31 mmol/L    Anion Gap, Calculation 8 5 - 18 mmol/L    Glucose 104 70 - 125 mg/dL    Calcium 8.7 8.5 - 10.5 mg/dL    BUN 15 8 - 28 mg/dL    Creatinine 1.10 0.70 - 1.30 mg/dL    GFR MDRD  Af Amer >60 >60 mL/min/1.73m2    GFR MDRD Non Af Amer >60 >60 mL/min/1.73m2       Assessment/Plan:  1. Right distal fibula fracture. Non operative management. In a cast with initial NWB, then TTWB after 3/3/20 ortho visit. Saw ortho today and now WBAT in CAM boot. RTC 4 weeks.   2. Atrial flutter. He is on xarelto. Adequate rate control with metoprolol.  3. HTN. Also on lisinopril. Monitor BPs in TCU, running low side, on low dose ace and beta blocker for cardioprotection. No dizziness. Cont to monitor.   4. CHF, CM, hx CAD. Continue torsemide prn, statin.  Follow up with cardiology.  5. Anemia. Hgb 6.7 upon hospital admit. Received IV iron, continues on oral. Follow up post TCU with PMD for recheck and further considerations of chronic anemia.   6. CKD. With ALEX in the hospital. Check BMP in TCU.  7. Chronic back pain.        Electronically signed by: Michaela Navarro MD

## 2021-06-19 NOTE — LETTER
Letter by Malina Verdin CNP at      Author: Malina Verdin CNP Service: -- Author Type: --    Filed:  Encounter Date: 7/9/2019 Status: (Other)         Patient: Chip Martin   MR Number: [id number]   YOB: 1945   Date of Visit: 7/9/2019     [facility]    Facility:   [facility] [[id number]]   Code Status: FULL CODE  PCP: Lynne Gomez MD   Phone: [phone]   Fax: [phone]      CHIEF COMPLAINT/REASON FOR VISIT:  Chief Complaint   Patient presents with   ? Discharge Summary       HISTORY COURSE:  Chip is a 73 y.o. male undergoing physical and occupational therapy at [facility].  He is with PMHx of CAD, MI s/p BMS x2 to LAD and PTCA to D1 (2012), ischemic cardiomyopathy (LVEF 40-45%), paroxysmal atrial flutter (on sotalol and Xarelto), hypertension, dyslipidemia, CKD 3, DAVID (intolerant to CPAP), 100-pack-year history of smoking who was recently hospitalized for similar presentation of syncope and collapse at [facility] 6/2-6/3 and prior to that on 3/11-3/13.  He was seen by Cardiology during hospitalization in 3/2019 and as outpatient after hospital discharge on 4/4.  Syncope was considered related to medication effect therefore diltiazem was discontinued and Coreg was titrated to 6.25 milligrams twice daily after cardiology visit on 4/4/2019.  Nuclear perfusion cardiac stress test was planned to be done before his appointment with Dr. Montoya in 4 to 5 months after his 4/2019 appointment with Cardiology.  He remains intolerant to CPAP and is not on home oxygen.  He was walking in [employer] felt lightheaded and passed out. ED evaluation showed mild hypoxia of 86% on room air, /81 pulse 69 in sinus rhythm.  Troponin negative, EKG unremarkable for ischemia or arrhythmia. Chest radiograph shows no infiltrate or CHF.  BNP is 24.  He was given IV fluids, started on oxygen and he started feeling better. He was admitted for  further evaluation.    Cardiology was consulted following admission. Etiology unclear, appears neurocardiogenic (orthostatis negative, no profound hypotension). Monitored on telemetry to exclude arrhythmic etiology. TTE was technically difficult, but LVEF 40-45% with mild global hypokinesis with possible akinetic apical lateral segment, no significant valvular disease, and carotid sinus massage did not result in sinus arrest. Dose of beta blocker was reduced, and Cardiology recommended 14 ACT monitor upon dismissal to TCU. MPI can be completed as outpatient.         Today he is seen for a face-to-face discharge.  He will discharge on 7/10/2019 back to his senior living apartment with current medications and treatments.  He will have \[facility]\"" home care services PT OT and a home health aide. He denied CP or  shortness of breath     He is moving his bowels and denied any urinary issues. His  BMP on 6/21/19 was within normal limits.  His lower extremity edema and right knee effusion are significantly decreased.  His gunter has been dc'd and he is voiding adequately.       Review of Systems  Constitutional: Negative for activity change, appetite change, chills, fatigue and fever.   HENT: Negative for congestion and sore throat.    Eyes: Negative for visual disturbance.   Respiratory: Positive for shortness of breath. Negative for cough and wheezing.    Cardiovascular: Negative for chest pain and leg swelling.   Gastrointestinal: Negative for abdominal distention, abdominal pain, constipation, diarrhea and nausea.   Genitourinary: Negative for dysuria.   Musculoskeletal: Negative for arthralgias and myalgias.   Skin: Negative for color change, rash and wound.   Neurological: Negative for dizziness, weakness and numbness.   Psychiatric/Behavioral: Negative for agitation, behavioral problems and sleep disturbance.   Vitals:    07/09/19 1146   BP: 125/67   Pulse: 76   Resp: 18   Temp: 97.5  F (36.4  C)   SpO2: 96%   Weight: (!)  273 lb 11.2 oz (124.1 kg)       Physical Exam  Constitutional: He is oriented to person, place, and time. He appears well-developed and well-nourished.   Pleasant gentleman in no acute distress.    HENT:   Head: Normocephalic and atraumatic.   Eyes: Pupils are equal, round, and reactive to light. Conjunctivae are normal.   Neck: Normal range of motion. Neck supple.   Cardiovascular: Normal rate, regular rhythm and normal heart sounds.   No murmur heard.  Pulmonary/Chest: Effort normal and breath sounds normal. He has no wheezes. He has no rales.   Abdominal: Soft. Bowel sounds are normal. He exhibits no distension. There is no tenderness.   Musculoskeletal: Normal range of motion. He exhibits edema.   Right knee effusion   1+ LLE  2+ RLE   Neurological: He is alert and oriented to person, place, and time.   Skin: Skin is warm and dry.   Psychiatric: He has a normal mood and affect. His behavior is normal.      MEDICATION LIST:  Current Outpatient Medications   Medication Sig   ? acetaminophen (TYLENOL) 500 MG tablet Take 1,000 mg by mouth every 6 (six) hours as needed for pain.   ? allopurinol (ZYLOPRIM) 100 MG tablet Take 100 mg by mouth daily.   ? aspirin 81 mg chewable tablet Chew 1 tablet (81 mg total) daily.   ? atorvastatin (LIPITOR) 20 MG tablet Take 40 mg by mouth at bedtime.          ? carvedilol (COREG) 3.125 MG tablet Take 3.125 mg by mouth 2 (two) times a day with meals.   ? fluticasone furoate-vilanterol (BREO ELLIPTA) 200-25 mcg/dose DsDv inhaler Inhale 1 puff daily.   ? nitroglycerin (NITROSTAT) 0.4 MG SL tablet Place 0.4 mg under the tongue every 5 (five) minutes as needed for chest pain.   ? polyethylene glycol (MIRALAX) 17 gram packet Take 17 g by mouth daily as needed.   ? rivaroxaban (XARELTO) 20 mg tablet Take 20 mg by mouth daily with supper.   ? senna-docusate (PERICOLACE) 8.6-50 mg tablet Take 2 tablets by mouth 2 (two) times a day. (Patient taking differently: Take 2 tablets by mouth 2 (two)  times a day as needed.       )   ? sotalol (BETAPACE) 80 MG tablet Take 40 mg by mouth at bedtime.   ? torsemide (DEMADEX) 10 MG tablet Take 10 mg by mouth daily as needed. Weight gain over 3 lbs or increased leg swelling       DISCHARGE DIAGNOSIS:    ICD-10-CM    1. History of syncope Z87.898    2. Congestive heart failure, unspecified HF chronicity, unspecified heart failure type (H) I50.9    3. Persistent atrial fibrillation (H) I48.1    4. Anticoagulation adequate Z79.01        MEDICAL EQUIPMENT NEEDS:  None needed    DISCHARGE PLAN/FACE TO FACE:  I certify that services are/were furnished while this patient was under the care of a physician and that a physician or an allowed non-physician practitioner (NPP), had a face-to-face encounter that meets the physician face-to-face encounter requirements. The encounter was in whole, or in part, related to the primary reason for home health. The patient is confined to his/her home and needs intermittent skilled nursing, physical therapy, speech-language pathology, or the continued need for occupational therapy. A plan of care has been established by a physician and is periodically reviewed by a physician.  Date of Face-to-Face Encounter: 7/9/19    I certify that, based on my findings, the following services are medically necessary home health services: [facility] PT/OT/HHA    My clinical findings support the need for the above skilled services because: PT OT for continued strength and endurance following recent hospitalization for syncope, home health aide to assist with activities of daily living.    This patient is homebound because: The patient is homebound because he is easily fatigued and deconditioned following recent hospitalization for syncope, requiring continued PT OT.    The patient is, or has been, under my care and I have initiated the establishment of the plan of care. This patient will be followed by a physician who will periodically review the plan of  care.    Schedule follow up visit with primary care provider within 7 days to reestablish care.    Electronically signed by: Malina Vedrin CNP

## 2021-06-19 NOTE — LETTER
Letter by Michaela Navarro MD at      Author: Michaela Navarro MD Service: -- Author Type: --    Filed:  Encounter Date: 7/9/2019 Status: (Other)         Patient: Chip Martin   MR Number: [id number]   YOB: 1945   Date of Visit: 7/9/2019     [facility]      Facility:    [facility] [[id number]]  Code Status: FULL CODE       Chief Complaint/Reason for Visit:  Chief Complaint   Patient presents with   ? H & P     Admit note to TCU-syncope, ischemic CM, aflutter.        HPI:   Chip is a 73 y.o. male with hx of ischemic CM, chronic back pain, CKD, HTN, aflutter on xarelto, presented to the hospital on 6/13/19 with syncope. His hospital discharge summary is partially excerpted below.     Hospital Course:   Chip Martin is a 73 y.o. male with PMHx of CAD, MI s/p BMS x2 to LAD and PTCA to D1 (2012), ischemic cardiomyopathy (LVEF 40-45%), paroxysmal atrial flutter (on sotalol and Xarelto), hypertension, dyslipidemia, CKD 3, DAVID (intolerant to CPAP), 100-pack-year history of smoking who was recently hospitalized for similar presentation of syncope and collapse at [facility] 6/2-6/3 and prior to that on 3/11-3/13.  He was seen by Cardiology during hospitalization in 3/2019 and as outpatient after hospital discharge on 4/4.  Syncope was considered related to medication effect therefore diltiazem was discontinued and Coreg was titrated to 6.25 milligrams twice daily after cardiology visit on 4/4/2019.  Nuclear perfusion cardiac stress test was planned to be done before his appointment with Dr. Montoya in 4 to 5 months after his 4/2019 appointment with Cardiology.  He remains intolerant to CPAP and is not on home oxygen.  He was walking in [employer], felt lightheaded and passed out. ED evaluation showed mild hypoxia of 86% on room air, /81 pulse 69 in sinus rhythm.  Troponin negative, EKG unremarkable for ischemia or arrhythmia. Chest radiograph  shows no infiltrate or CHF.  BNP is 24.  He was given IV fluids, started on oxygen and he started feeling better. He was admitted for further evaluation.    Cardiology was consulted following admission. Etiology unclear, appears neurocardiogenic (orthostatis negative, no profound hypotension). Monitored on telemetry to exclude arrhythmic etiology. TTE was technically difficult, but LVEF 40-45% with mild global hypokinesis with possible akinetic apical lateral segment, no significant valvular disease, and carotid sinus massage did not result in sinus arrest. Dose of beta blocker was reduced, and Cardiology recommended 14 ACT monitor upon dismissal to TCU. MPI can be completed as outpatient.    During the hospitalization he was also seen by Pulmonology for mild hypercapnea and hypoxemia. Likely due to COPD and ANA MARIA, will require outpatient pulmonary evaluation and should [place] [facility] [place] when able. He is discharging to TCU on supplemental oxygen 2L NC.    The patient was also seen by orthopedics for significant R knee effusion. Diagnostic and therapeutic tap was considered but deferred due to concern for infectious risk. He had symptomatic improvement with anti-inflammatories. He did have elevated uric acid 7.8 and was started on low dose allopurinol earlier in the hospitalization. If persistent swelling and/or pain, can reconsider joint tap.    Overall stabilized and discharged to TCU on 6/18/19 for PT, OT, nursing cares, medical management and monitoring.     Today:  He has been doing well, participating with therapy, gaining strength. He plans to return home to independent living later this week. He currently denies any chest pain, shortness of breath. Appetite is good. No diarrhea or constipation. No dizziness or syncopal episodes in TCU. He has no new vision or hearing concerns.  Complains of chronic back and knee pain, no new concerns.       Past Medical History:  Past Medical History:   Diagnosis  Date   ? ALEX (acute kidney injury) (H)    ? Atrial fibrillation (H)    ? CAD (coronary artery disease)    ? Chronic bronchitis (H)    ? Chronic hypercapnic respiratory failure (H)    ? Chronic lower back pain    ? CKD (chronic kidney disease)    ? COPD (chronic obstructive pulmonary disease) (H)    ? Depressive disorder    ? ED (erectile dysfunction)    ? HLD (hyperlipidemia)    ? Hypertension    ? Ischemic cardiomyopathy    ? Knee effusion, right    ? Lumbago    ? Opiate abuse, continuous (H)    ? DAVID (obstructive sleep apnea)    ? Prostate cancer (H)    ? Syncope    ? Tobacco use disorder    ? Vitamin D deficiency            Surgical History:  Past Surgical History:   Procedure Laterality Date   ? BACK SURGERY     ? CORONARY ANGIOPLASTY     ? LAPAROSCOPIC CHOLECYSTECTOMY  2016   ? PROSTATE BIOPSY         Family History:   Family History   Problem Relation Age of Onset   ? Heart disease Father    ? Heart disease Brother        Social History:    Social History     Socioeconomic History   ? Marital status:      Spouse name: Not on file   ? Number of children: Not on file   ? Years of education: Not on file   ? Highest education level: Not on file   Occupational History   ? Not on file   Social Needs   ? Financial resource strain: Not on file   ? Food insecurity:     Worry: Not on file     Inability: Not on file   ? Transportation needs:     Medical: Not on file     Non-medical: Not on file   Tobacco Use   ? Smoking status: Former Smoker     Packs/day: 2.00     Years: 50.00     Pack years: 100.00     Types: Cigarettes     Last attempt to quit: 2011     Years since quittin.5   ? Smokeless tobacco: Never Used   Substance and Sexual Activity   ? Alcohol use: No   ? Drug use: No   ? Sexual activity: Never   Lifestyle   ? Physical activity:     Days per week: Not on file     Minutes per session: Not on file   ? Stress: Not on file   Relationships   ? Social connections:     Talks on phone: Not on  file     Gets together: Not on file     Attends [employer] service: Not on file     Active member of club or organization: Not on file     Attends meetings of clubs or organizations: Not on file     Relationship status: Not on file   ? Intimate partner violence:     Fear of current or ex partner: Not on file     Emotionally abused: Not on file     Physically abused: Not on file     Forced sexual activity: Not on file   Other Topics Concern   ? Not on file   Social History Narrative   ? Not on file       Medications:  Current Outpatient Medications   Medication Sig   ? acetaminophen (TYLENOL) 500 MG tablet Take 1,000 mg by mouth every 6 (six) hours as needed for pain.   ? allopurinol (ZYLOPRIM) 100 MG tablet Take 100 mg by mouth daily.   ? aspirin 81 mg chewable tablet Chew 1 tablet (81 mg total) daily.   ? atorvastatin (LIPITOR) 20 MG tablet Take 40 mg by mouth at bedtime.          ? carvedilol (COREG) 3.125 MG tablet Take 3.125 mg by mouth 2 (two) times a day with meals.   ? fluticasone furoate-vilanterol (BREO ELLIPTA) 200-25 mcg/dose DsDv inhaler Inhale 1 puff daily.   ? nitroglycerin (NITROSTAT) 0.4 MG SL tablet Place 0.4 mg under the tongue every 5 (five) minutes as needed for chest pain.   ? polyethylene glycol (MIRALAX) 17 gram packet Take 17 g by mouth daily as needed.   ? rivaroxaban (XARELTO) 20 mg tablet Take 20 mg by mouth daily with supper.   ? senna-docusate (PERICOLACE) 8.6-50 mg tablet Take 2 tablets by mouth 2 (two) times a day. (Patient taking differently: Take 2 tablets by mouth 2 (two) times a day as needed.       )   ? sotalol (BETAPACE) 80 MG tablet Take 40 mg by mouth at bedtime.   ? torsemide (DEMADEX) 10 MG tablet Take 10 mg by mouth daily as needed. Weight gain over 3 lbs or increased leg swelling       Allergies:  Allergies   Allergen Reactions   ? Blood-Group Specific Substance      Patient has Anti-Big E antibody.  Blood product orders may be delayed.  Please draw one red top and two  "lavender top tubes for all Type and Screen/Type and Crossmatch orders.   ? Gadopentetate Dimeglumine      Other reaction(s): Syncope  \"on the floor\", states that he passed out   ? Gadoversetamide      Other reaction(s): Syncope  \"on the floor\", states that he passed out   ? Iodinated Contrast- Oral And Iv Dye      Syncope          Review of Systems:  Pertinent items are noted in HPI.      Physical Exam:   General: Patient is alert male, no distress.   Vitals: /78, Temp 98, Pulse 78, RR 18, O2 sat 94% RA.  HEENT: Head is NCAT. Eyes show no injection or icterus. Nares negative. Oropharynx well hydrated.  Neck: Supple. No tenderness or adenopathy. No JVD.  Lungs: Diminished though clear bilaterally. No wheezes.  Cardiovascular: Regular rate and rhythm, normal S1. S2.  Back: No spinal or CVA tenderness.  Abdomen: Obese, soft, no tenderness on exam. Bowel sounds present. No guarding rebound or rigidity.  : Deferred.  Extremities: Mod LE edema is noted.  Musculoskeletal: Age related degen changes.   Skin: No rashes.   Psych: Mood appears good.      Labs:  Results for orders placed or performed in visit on 06/21/19   Basic Metabolic Panel   Result Value Ref Range    Sodium 141 136 - 145 mmol/L    Potassium 3.7 3.5 - 5.0 mmol/L    Chloride 107 98 - 107 mmol/L    CO2 26 22 - 31 mmol/L    Anion Gap, Calculation 8 5 - 18 mmol/L    Glucose 104 70 - 125 mg/dL    Calcium 8.7 8.5 - 10.5 mg/dL    BUN 15 8 - 28 mg/dL    Creatinine 1.10 0.70 - 1.30 mg/dL    GFR MDRD Af Amer >60 >60 mL/min/1.73m2    GFR MDRD Non Af Amer >60 >60 mL/min/1.73m2       Assessment/Plan:  1. History of syncope. No episodes in TCU. Had cardiac monitor, will follow up with cardiology. Meds adjusted in the hospital.   2. HTN. BPs satisfactory overall. Continue on coreg.  3. Aflutter. Rate controlled, on sotalol and xarelto.  4. Chronic back pain. Baseline, no new concerns.   5. Chronic bronchitis. No respiratory concerns.  6. CKD. Labs as noted above. " ALEX in the hospital.   7. Right knee effusion. Improved. Follow up with ortho if further concerns.  8. DAVID. Intolerant of CPAP.  9. Ischemic CM. On carvedilol and torsemide.  10. Code status is full code.            Electronically signed by: Michaela Navarro MD

## 2021-06-19 NOTE — LETTER
Letter by Malina Verdin CNP at      Author: Malina Verdin CNP Service: -- Author Type: --    Filed:  Encounter Date: 6/19/2019 Status: (Other)         Patient: Chip Martin   MR Number: [id number]   YOB: 1945   Date of Visit: 6/19/2019     [facility]    Facility:   [facility] [[id number]]   Code Status: FULL CODE      CHIEF COMPLAINT/REASON FOR VISIT:  Chief Complaint   Patient presents with   ? Review Of Multiple Medical Conditions       HISTORY:      HPI: Chip is a 73 y.o. male undergoing physical and occupational therapy at [facility].     Today he is seen for  Routine first visit to review multiple medical issues. He denied CP or  shortness of breath however he is on 2L NC.  Records indicated that he quit smoking years ago but he is found to have cigarettes among his belongings. He tells me he started again and refused interventions when offered. He reports no BM x 5 days. He is passing flatus and had no abdominal tenderness. There were orders that he was discharged with an ACT monitor. He tells me it was taken of fin the hospital. Staff to follow up with orders. He will have a BMP on 6/21/19. He is on daily weights with PRN torsemide .     Past Medical History:   Diagnosis Date   ? Chronic lower back pain    ? Hypertension    ? Opiate abuse, continuous              No family history on file.  Social History     Socioeconomic History   ? Marital status:      Spouse name: Not on file   ? Number of children: Not on file   ? Years of education: Not on file   ? Highest education level: Not on file   Occupational History   ? Not on file   Social Needs   ? Financial resource strain: Not on file   ? Food insecurity:     Worry: Not on file     Inability: Not on file   ? Transportation needs:     Medical: Not on file     Non-medical: Not on file   Tobacco Use   ? Smoking status: Unknown If Ever Smoked   Substance and Sexual  Activity   ? Alcohol use: No   ? Drug use: No   ? Sexual activity: Never   Lifestyle   ? Physical activity:     Days per week: Not on file     Minutes per session: Not on file   ? Stress: Not on file   Relationships   ? Social connections:     Talks on phone: Not on file     Gets together: Not on file     Attends Worship service: Not on file     Active member of club or organization: Not on file     Attends meetings of clubs or organizations: Not on file     Relationship status: Not on file   ? Intimate partner violence:     Fear of current or ex partner: Not on file     Emotionally abused: Not on file     Physically abused: Not on file     Forced sexual activity: Not on file   Other Topics Concern   ? Not on file   Social History Narrative   ? Not on file         Review of Systems   Constitutional: Negative for activity change, appetite change, chills, fatigue and fever.   HENT: Negative for congestion and sore throat.    Eyes: Negative for visual disturbance.   Respiratory: Positive for shortness of breath. Negative for cough and wheezing.         Currently on 2L oxygen NC    Cardiovascular: Negative for chest pain and leg swelling.   Gastrointestinal: Negative for abdominal distention, abdominal pain, constipation, diarrhea and nausea.   Genitourinary: Negative for dysuria.   Musculoskeletal: Negative for arthralgias and myalgias.   Skin: Negative for color change, rash and wound.   Neurological: Negative for dizziness, weakness and numbness.   Psychiatric/Behavioral: Negative for agitation, behavioral problems and sleep disturbance.       .  Vitals:    06/19/19 0841   BP: 127/81   Pulse: 74   Resp: 18   Temp: 97.4  F (36.3  C)   SpO2: 95%   Weight: (!) 281 lb 8 oz (127.7 kg)       Physical Exam   Constitutional: He is oriented to person, place, and time. He appears well-developed and well-nourished.   Pleasant gentleman in no acute distress.    HENT:   Head: Normocephalic and atraumatic.   Eyes: Pupils are  equal, round, and reactive to light. Conjunctivae are normal.   Neck: Normal range of motion. Neck supple.   Cardiovascular: Normal rate, regular rhythm and normal heart sounds.   No murmur heard.  Pulmonary/Chest: Effort normal and breath sounds normal. He has no wheezes. He has no rales.   Abdominal: Soft. Bowel sounds are normal. He exhibits no distension. There is no tenderness.   Musculoskeletal: Normal range of motion. He exhibits no edema.   Right knee effusion    Neurological: He is alert and oriented to person, place, and time.   Skin: Skin is warm and dry.   Psychiatric: He has a normal mood and affect. His behavior is normal.         LABS:   No results found for this or any previous visit (from the past 240 hour(s)).  Current Outpatient Medications   Medication Sig   ? aspirin 81 mg chewable tablet Chew 1 tablet (81 mg total) daily.   ? atorvastatin (LIPITOR) 20 MG tablet Take 20 mg by mouth bedtime.   ? beclomethasone (QVAR) 80 mcg/actuation inhaler Inhale 1 puff 2 (two) times a day.   ? diltiazem (CARDIZEM CD) 240 MG 24 hr capsule Take 240 mg by mouth daily.   ? DULoxetine (CYMBALTA) 30 MG capsule Take 1 capsule (30 mg total) by mouth every morning.   ? metoprolol tartrate (LOPRESSOR) 50 MG tablet Take 50 mg by mouth 2 (two) times a day.   ? nitroglycerin (NITROSTAT) 0.4 MG SL tablet Place 0.4 mg under the tongue every 5 (five) minutes as needed for chest pain.   ? omeprazole (PRILOSEC) 20 MG capsule Take 1 capsule (20 mg total) by mouth Daily before breakfast.   ? senna-docusate (PERICOLACE) 8.6-50 mg tablet Take 2 tablets by mouth 2 (two) times a day.   ? warfarin (COUMADIN) 1 MG tablet Take 1.5 tablet by mouth daily per pharmacist order.. Adjust dose based on INR results on 9/26/2016.as directed.     ASSESSMENT:      ICD-10-CM    1. Persistent atrial fibrillation (H) I48.1    2. Major depressive disorder, remission status unspecified, unspecified whether recurrent F32.9    3. Congestive heart  failure, unspecified HF chronicity, unspecified heart failure type (H) I50.9    4. Secondary hypertension I15.9    5. Effusion of right knee M25.461        PLAN:    Atrial fibrillation- on coumadin and metoprolol  CHF- daily weights, prn torsemide   Major depression on Cymbalta  Tobacco abuse records indicate he quit 8 years ago however he restarted and declined interventions.   Hypertension- on metoprolol-stable.  Right knee effusion-pain control, ice, Scheduled tylenol, seen by orthopedic who declined tapping the knee at this time due to risk of infection- follow up if worsens.   Constipation - bowel medications adjusted.   Anticoagulation - On coumadin Adjust medication per INR results.     Electronically signed by: Malina Verdin CNP

## 2021-06-19 NOTE — LETTER
"Letter by Yaneth Palmer CNP at      Author: Yaneth Palmer CNP Service: -- Author Type: --    Filed:  Encounter Date: 7/5/2019 Status: (Other)         Patient: Chip Martin   MR Number: [id number]   YOB: 1945   Date of Visit: 7/5/2019     Code Status:  FULL CODE  Visit Type: Follow Up     Facility:  [facility] [[id number]]        Facility Type: SNF (Skilled Nursing Facility, TCU)    History of Present Illness: Chip Martin is a 73 y.o. male, who I see today for a TCU follow up visit.  He has a past medical history for CAD, MI with PTCA, ischemic cardiomyopathy with EF of 40-45%, paroxysmal afib, hypertension, dyslipidemia, CKD3, DAVID, tobacco use.  He has multiple hospitalization in the past 3 months for syncopal episodes.  Most recent being 6/2/2019-6/3/2019 in which he felt lightheaded and passed out while walking into [employer].  He has had multiple workups with unclear etiology.  It is posited to be neurocardiogenic.  He will have a MPI as an outpatient.     Today, He reports that he has smoked recently and states \"I will do what I want, so if I feel like smoking I will do that, I don't smoke that often\".  He reports therapy is going well.  He denies any dizziness or lightheadedness since being at the TCU.  His lower extremities do have +1 pitting edema bilaterally.  He is not wearing compression stockings during our visit.   He continues to have pain in his right knee and low back in which he states is stable at this time.  He continues to have right knee effusion.  He reports his BMs have been normal however he has not gone for approximately 3 days.  He refuses to take any stool meds and it not able to articulate his reasoning.  I did  him on high fiber foods including prune juice.      Review of Systems   Patient denies fever, chills, headache, lightheadedness, dizziness, rhinorrhea, cough, congestion, shortness of breath, chest pain, palpitations, " abdominal pain, n/v, diarrhea, constipation, change in appetite, dysuria, frequency, burning or pain with urination.  Other than stated in HPI all other review of systems is negative.         Physical Exam   Vital signs: /78, HR 80, resp 20, temp 96.9  GENERAL APPEARANCE: Well developed, well nourished, in no acute distress.  HEENT: normocephalic, atraumatic  PERRL, sclerae anicteric, conjunctivae clear and moist, EOM intact  LUNGS: Lung sounds CTA, no adventitious sounds, respiratory effort normal.  CARD: RRR, S1, S2, without murmurs, gallops, rubs, no JVD, ABD: Soft and nontender with normal bowel sounds.   MSK: Muscle strength and tone were normal.  EXTREMITIES:1+ pitting edema bilateral lower extremities, right knee effusion without heat or redness.   NEURO: Alert and oriented x 3.  Face is symmetric.  SKIN: Inspection of the skin reveals no rashes, ulcerations or petechiae.  PSYCH: euthymic          Labs: No results found for this or any previous visit (from the past 240 hour(s)).      Assessment:  1. History of syncope     2. Persistent atrial fibrillation (H)     3. Congestive heart failure, unspecified HF chronicity, unspecified heart failure type (H)     4. Secondary hypertension     5. Major depressive disorder, remission status unspecified, unspecified whether recurrent         Plan:     Syncope: no episodes at the TCU, continue with therapy.  Counseled on sitting down if feeling dizzy.     Afib: continue on carvedilol, and xarelto, continue with ASA for CAD and hx of MI    CHF: continue on torsemide prn for leg swelling or increase in 3lbs.     HTN: continue on carvedilol and betapace.  BPs are stable.     Depression:  I did  on mood and how this can affect his health.  He reports that he is not depressed but he is frustrated with his situation.  I explained that depressed would be natural for his condition and the unknown circumstances.     Electronically signed by: Yaneth Palmer CNP

## 2021-06-19 NOTE — LETTER
Letter by Malina Verdin CNP at      Author: Malian Verdin CNP Service: -- Author Type: --    Filed:  Encounter Date: 6/27/2019 Status: (Other)         Patient: Chip Martin   MR Number: [id number]   YOB: 1945   Date of Visit: 6/27/2019     [facility]    Facility:   [facility] [[id number]]   Code Status: FULL CODE      CHIEF COMPLAINT/REASON FOR VISIT:  Chief Complaint   Patient presents with   ? Review Of Multiple Medical Conditions       HISTORY:      HPI: Chip is a 73 y.o. male undergoing physical and occupational therapy at [facility].  He is with PMHx of CAD, MI s/p BMS x2 to LAD and PTCA to D1 (2012), ischemic cardiomyopathy (LVEF 40-45%), paroxysmal atrial flutter (on sotalol and Xarelto), hypertension, dyslipidemia, CKD 3, DAVID (intolerant to CPAP), 100-pack-year history of smoking who was recently hospitalized for similar presentation of syncope and collapse at [facility] 6/2-6/3 and prior to that on 3/11-3/13.  He was seen by Cardiology during hospitalization in 3/2019 and as outpatient after hospital discharge on 4/4.  Syncope was considered related to medication effect therefore diltiazem was discontinued and Coreg was titrated to 6.25 milligrams twice daily after cardiology visit on 4/4/2019.  Nuclear perfusion cardiac stress test was planned to be done before his appointment with Dr. Montoya in 4 to 5 months after his 4/2019 appointment with Cardiology.  He remains intolerant to CPAP and is not on home oxygen.  He was walking in [employer] felt lightheaded and passed out. ED evaluation showed mild hypoxia of 86% on room air, /81 pulse 69 in sinus rhythm.  Troponin negative, EKG unremarkable for ischemia or arrhythmia. Chest radiograph shows no infiltrate or CHF.  BNP is 24.  He was given IV fluids, started on oxygen and he started feeling better. He was admitted for further evaluation.    Cardiology was  consulted following admission. Etiology unclear, appears neurocardiogenic (orthostatis negative, no profound hypotension). Monitored on telemetry to exclude arrhythmic etiology. TTE was technically difficult, but LVEF 40-45% with mild global hypokinesis with possible akinetic apical lateral segment, no significant valvular disease, and carotid sinus massage did not result in sinus arrest. Dose of beta blocker was reduced, and Cardiology recommended 14 ACT monitor upon dismissal to TCU. MPI can be completed as outpatient.       Today he is seen for a  routine  visit to review multiple medical issues. He denied CP or  shortness of breath    He reports no BM x 2 days. And staff reported he has been refusing his bowel medications. He was educated on taking them unless he has loose stools. He is passing flatus and had no abdominal tenderness. His  BMP on 6/21/19 was within normal limits. . He is on daily weights with PRN torsemide . He is down 5.5 pounds over the last week. His lower extremity edema and right knee effusion are significantly decreased.  His gunter has been dc'd and he is voiding adequately. He is now wearing his heart monitor and will wear it until 7/8.    Past Medical History:   Diagnosis Date   ? ALEX (acute kidney injury) (H)    ? Atrial fibrillation (H)    ? CAD (coronary artery disease)    ? Chronic bronchitis (H)    ? Chronic hypercapnic respiratory failure (H)    ? Chronic lower back pain    ? CKD (chronic kidney disease)    ? COPD (chronic obstructive pulmonary disease) (H)    ? Depressive disorder    ? ED (erectile dysfunction)    ? HLD (hyperlipidemia)    ? Hypertension    ? Ischemic cardiomyopathy    ? Knee effusion, right    ? Lumbago    ? Opiate abuse, continuous (H)    ? DAVID (obstructive sleep apnea)    ? Prostate cancer (H)    ? Syncope    ? Tobacco use disorder    ? Vitamin D deficiency              Family History   Problem Relation Age of Onset   ? Heart disease Father    ? Heart disease  Brother      Social History     Socioeconomic History   ? Marital status:      Spouse name: Not on file   ? Number of children: Not on file   ? Years of education: Not on file   ? Highest education level: Not on file   Occupational History   ? Not on file   Social Needs   ? Financial resource strain: Not on file   ? Food insecurity:     Worry: Not on file     Inability: Not on file   ? Transportation needs:     Medical: Not on file     Non-medical: Not on file   Tobacco Use   ? Smoking status: Former Smoker     Packs/day: 2.00     Years: 50.00     Pack years: 100.00     Types: Cigarettes     Last attempt to quit: 2011     Years since quittin.4   ? Smokeless tobacco: Never Used   Substance and Sexual Activity   ? Alcohol use: No   ? Drug use: No   ? Sexual activity: Never   Lifestyle   ? Physical activity:     Days per week: Not on file     Minutes per session: Not on file   ? Stress: Not on file   Relationships   ? Social connections:     Talks on phone: Not on file     Gets together: Not on file     Attends Orthodoxy service: Not on file     Active member of club or organization: Not on file     Attends meetings of clubs or organizations: Not on file     Relationship status: Not on file   ? Intimate partner violence:     Fear of current or ex partner: Not on file     Emotionally abused: Not on file     Physically abused: Not on file     Forced sexual activity: Not on file   Other Topics Concern   ? Not on file   Social History Narrative   ? Not on file         Review of Systems   Constitutional: Negative for activity change, appetite change, chills, fatigue and fever.   HENT: Negative for congestion and sore throat.    Eyes: Negative for visual disturbance.   Respiratory: Positive for shortness of breath. Negative for cough and wheezing.         Currently on 2L oxygen NC    Cardiovascular: Negative for chest pain and leg swelling.   Gastrointestinal: Negative for abdominal distention, abdominal  pain, constipation, diarrhea and nausea.   Genitourinary: Negative for dysuria.   Musculoskeletal: Negative for arthralgias and myalgias.   Skin: Negative for color change, rash and wound.   Neurological: Negative for dizziness, weakness and numbness.   Psychiatric/Behavioral: Negative for agitation, behavioral problems and sleep disturbance.       .  Vitals:    06/27/19 0822   BP: 116/76   Pulse: 84   Resp: 20   Temp: 97.7  F (36.5  C)   SpO2: 92%   Weight: (!) 280 lb 3.2 oz (127.1 kg)       Physical Exam   Constitutional: He is oriented to person, place, and time. He appears well-developed and well-nourished.   Pleasant gentleman in no acute distress.    HENT:   Head: Normocephalic and atraumatic.   Eyes: Pupils are equal, round, and reactive to light. Conjunctivae are normal.   Neck: Normal range of motion. Neck supple.   Cardiovascular: Normal rate, regular rhythm and normal heart sounds.   No murmur heard.  Pulmonary/Chest: Effort normal and breath sounds normal. He has no wheezes. He has no rales.   Abdominal: Soft. Bowel sounds are normal. He exhibits no distension. There is no tenderness.   Musculoskeletal: Normal range of motion. He exhibits edema.   Right knee effusion   1+ LLE  2+ RLE   Neurological: He is alert and oriented to person, place, and time.   Skin: Skin is warm and dry.   Psychiatric: He has a normal mood and affect. His behavior is normal.         LABS:   Recent Results (from the past 240 hour(s))   Basic Metabolic Panel   Result Value Ref Range    Sodium 141 136 - 145 mmol/L    Potassium 3.7 3.5 - 5.0 mmol/L    Chloride 107 98 - 107 mmol/L    CO2 26 22 - 31 mmol/L    Anion Gap, Calculation 8 5 - 18 mmol/L    Glucose 104 70 - 125 mg/dL    Calcium 8.7 8.5 - 10.5 mg/dL    BUN 15 8 - 28 mg/dL    Creatinine 1.10 0.70 - 1.30 mg/dL    GFR MDRD Af Amer >60 >60 mL/min/1.73m2    GFR MDRD Non Af Amer >60 >60 mL/min/1.73m2     Current Outpatient Medications   Medication Sig   ? acetaminophen (TYLENOL)  500 MG tablet Take 1,000 mg by mouth every 6 (six) hours as needed for pain.   ? allopurinol (ZYLOPRIM) 100 MG tablet Take 100 mg by mouth daily.   ? aspirin 81 mg chewable tablet Chew 1 tablet (81 mg total) daily.   ? atorvastatin (LIPITOR) 20 MG tablet Take 40 mg by mouth at bedtime.          ? carvedilol (COREG) 3.125 MG tablet Take 3.125 mg by mouth 2 (two) times a day with meals.   ? fluticasone furoate-vilanterol (BREO ELLIPTA) 200-25 mcg/dose DsDv inhaler Inhale 1 puff daily.   ? ipratropium-albuterol (DUO-NEB) 0.5-2.5 mg/3 mL nebulizer Inhale 3 mL 4 (four) times a day as needed.   ? nitroglycerin (NITROSTAT) 0.4 MG SL tablet Place 0.4 mg under the tongue every 5 (five) minutes as needed for chest pain.   ? polyethylene glycol (MIRALAX) 17 gram packet Take 17 g by mouth daily as needed.   ? rivaroxaban (XARELTO) 20 mg tablet Take 20 mg by mouth daily with supper.   ? senna-docusate (PERICOLACE) 8.6-50 mg tablet Take 2 tablets by mouth 2 (two) times a day.   ? sotalol (BETAPACE) 80 MG tablet Take 40 mg by mouth at bedtime.   ? torsemide (DEMADEX) 10 MG tablet Take 10 mg by mouth daily as needed. Weight gain over 3 lbs or increased leg swelling   ? warfarin (COUMADIN) 1 MG tablet Take 1.5 tablet by mouth daily per pharmacist order.. Adjust dose based on INR results on 9/26/2016.as directed.     ASSESSMENT:      ICD-10-CM    1. Persistent atrial fibrillation (H) I48.1    2. Congestive heart failure, unspecified HF chronicity, unspecified heart failure type (H) I50.9    3. Secondary hypertension I15.9    4. Effusion of right knee M25.461        PLAN:    Atrial fibrillation- on coumadin and metoprolol  CHF- daily weights, prn torsemide   Major depression on Cymbalta  Tobacco abuse records indicate he quit 8 years ago however he restarted and declined interventions.   Hypertension- on metoprolol-stable.  Right knee effusion-pain control, ice, Scheduled tylenol, seen by orthopedic who declined tapping the knee at  this time due to risk of infection- follow up if worsens. Edema significantly reduced and pain controlled.   Constipation - Pt has been refusing bowel medications - educated on taking them  Anticoagulation - On coumadin Adjust medication per INR results.     Electronically signed by: Malina Verdin CNP

## 2021-06-20 NOTE — LETTER
"Letter by Michaela Navarro MD at      Author: Michaela Navarro MD Service: -- Author Type: --    Filed:  Encounter Date: 3/17/2020 Status: (Other)         Patient: Chip Martin   MR Number: [id number]   YOB: 1945   Date of Visit: 3/17/2020     [facility]    Facility:   [facility] [[id number]]   Code Status: FULL CODE       Chief Complaint   Patient presents with   ? Follow Up     TCU 3/17/20.       HPI:   Chip is a 74 y.o. male with hx of atrial flutter on xarelto, CAD, CM, CHF, CKD, HTN, admitted to the hospital on 2/25/20 after a fall with right ankle pain.     HISTORY OF PRESENT ILLNESS: Chip Martin is a 74 y.o. male with a history as noted who had a fall the AM of admission and now presenting with ankle pain.  Patient has not felt himself this last week, attributes most of that to back pain (which is chronic for many years), but also feeling fatigued. He gets worsening weakness/deconditioning and dyspnea when he exerts himself. [place] back and forth for a few things then sat down to rest on his walker when he felt \"off\", with a headache, weakness. He slipped off his walker and felt on his ankle and then hit his knee. He was unable to get up, was brought in by EMS complaining of right knee and ankle pain. Was lethargic initially, this improved. He denies alcohol and drug use. Denies prodromal symptoms (ie palpitations, lightheadedness, dizziness, orthostasis, chest pain), LOC/head trauma, sick contacts/prior illness/recent travel. Currently he has pain in his back \"I've seen all the pain docs here and they all hate me\" and right ankle. In the ED his vitals were hypotensive (responded to 1L IVF). His labs showed Hgb 6.7. Imaging showed fractured distal right fibula. Ortho consulted in the ED. He was admitted for further management and evaluation.     Hospital Course:   Chip Martin is a 74 y.o. male with a history of " chronic kidney disease, hypertension, obstructive sleep apnea, atrial flutter on Xarelto, chronic systolic CHF who had a fall the AM of admission and now presenting with ankle pain. See H&P for full details (From 2/25/20). Imaging showed a distal fracture of right fibula. Due to his fall, ankle fracture and anticoagulation state he was admitted. CT head negative for bleed; initial hypotension improved with fluids. Ortho consulted who recommend non-surgical interventions. PT/OT recommend TCU and wheelchair due to his NWB status and obesity/debility making it difficult to be NWB with his walker. Hgb was noted to be 6.7 on admission, MCV very low; Ferritin checked due to his history of Fe deficiency anemia and came back at 3.0. He was given IV Fe 2/26/20 then started on PO replacement. He will follow up with Orthopedics in 1 week. He was discharged to TCU in stable condition on 2/28/2020 and was in agreement with the above plan.     Overall stabilized and discharged to TCU on 2/28/20 for PT, OT, nursing cares, medical management and monitoring.     Today:  He went to see ortho today, cast was removed right LE and he is now in a CAM, orders to have it on all the time except for three times a day ROM and for hygiene. He can WBAT with the boot. Today he is mostly bothered by his chronic back pain, no new concerns, just chronic. He denies pain in right ankle. He is otherwise doing okay. Denies cough, congestion, fever. No dizziness or palpitations. No shortness of breath or chest pain. Appetite is good. No diarrhea or constipation. He is hopeful to go home soon, has a car conference this afternoon.      Past Medical History:   Past Medical History:   Diagnosis Date   ? Acute on chronic anemia    ? ALEX (acute kidney injury) (H)    ? Atrial fibrillation (H)    ? CAD (coronary artery disease)    ? Chronic bronchitis (H)    ? Chronic hypercapnic respiratory failure (H)    ? Chronic lower back pain    ? Chronic systolic heart  failure (H)    ? CKD (chronic kidney disease)    ? Closed right fibular fracture     distal end   ? COPD (chronic obstructive pulmonary disease) (H)    ? Depressive disorder    ? ED (erectile dysfunction)    ? HLD (hyperlipidemia)    ? Hypertension    ? Hypertrophy of prostate without urinary obstruction    ? Ischemic cardiomyopathy    ? Knee effusion, right    ? Lumbago    ? Opiate abuse, continuous (H)    ? DAVID (obstructive sleep apnea)    ? Prostate cancer (H)    ? Syncope    ? Syncope    ? Tobacco use disorder    ? Vitamin D deficiency        Medications:  Current Outpatient Medications   Medication Sig   ? acetaminophen (TYLENOL) 500 MG tablet Take 1,000 mg by mouth 3 (three) times a day.    ? atorvastatin (LIPITOR) 20 MG tablet Take 40 mg by mouth at bedtime.          ? ferrous sulfate 325 (65 FE) MG tablet Take 1 tablet by mouth daily with breakfast.   ? lisinopriL (PRINIVIL,ZESTRIL) 2.5 MG tablet Take 2.5 mg by mouth daily.   ? metoprolol succinate (TOPROL-XL) 25 MG Take 12.5 mg by mouth daily.   ? nitroglycerin (NITROSTAT) 0.4 MG SL tablet Place 0.4 mg under the tongue every 5 (five) minutes as needed for chest pain.   ? omeprazole (PRILOSEC) 20 MG capsule Take 20 mg by mouth daily before breakfast.   ? rivaroxaban (XARELTO) 20 mg tablet Take 20 mg by mouth daily with supper.   ? torsemide (DEMADEX) 10 MG tablet Take 10 mg by mouth daily as needed. Weight gain over 3 lbs or increased leg swelling       Physical Exam:   General: Patient is alert male, no distress.   Vitals: /69, Temp 98, Pulse 86, RR 16, O2 sat 92%RA.  HEENT: Head is NCAT. Eyes show no injection or icterus. Nares negative. Oropharynx well hydrated.  Abdomen: Obese.  Extremities: Right LE CAM boot.    Musculoskeletal: Degen changes.   Skin: Warm and dry.  Psych: Mood appears pretty good.      Labs:  Results for orders placed or performed in visit on 06/21/19   Basic Metabolic Panel   Result Value Ref Range    Sodium 141 136 - 145 mmol/L     Potassium 3.7 3.5 - 5.0 mmol/L    Chloride 107 98 - 107 mmol/L    CO2 26 22 - 31 mmol/L    Anion Gap, Calculation 8 5 - 18 mmol/L    Glucose 104 70 - 125 mg/dL    Calcium 8.7 8.5 - 10.5 mg/dL    BUN 15 8 - 28 mg/dL    Creatinine 1.10 0.70 - 1.30 mg/dL    GFR MDRD Af Amer >60 >60 mL/min/1.73m2    GFR MDRD Non Af Amer >60 >60 mL/min/1.73m2       Assessment/Plan:  1. Right distal fibula fracture. Non operative management. In a cast with initial NWB, then TTWB after 3/3/20 ortho visit. Saw ortho today and now WBAT in CAM boot. RTC 4 weeks.   2. Atrial flutter. He is on xarelto. Adequate rate control with metoprolol.  3. HTN. Also on lisinopril. Monitor BPs in TCU, running low side, on low dose ace and beta blocker for cardioprotection. No dizziness. Cont to monitor.   4. CHF, CM, hx CAD. Continue torsemide prn, statin.  Follow up with cardiology.  5. Anemia. Hgb 6.7 upon hospital admit. Received IV iron, continues on oral. Follow up post TCU with PMD for recheck and further considerations of chronic anemia.   6. CKD. With ALEX in the hospital. Check BMP in TCU.  7. Chronic back pain.        Electronically signed by: Michaela Navarro MD

## 2021-06-20 NOTE — LETTER
Letter by Malina Verdin CNP at      Author: Malina Verdin CNP Service: -- Author Type: --    Filed:  Encounter Date: 3/4/2020 Status: (Other)         Patient: Chip Martin   MR Number: [id number]   YOB: 1945   Date of Visit: 3/4/2020     [facility]    Facility:   [facility] [[id number]]   Code Status: FULL CODE      CHIEF COMPLAINT/REASON FOR VISIT:  Chief Complaint   Patient presents with   ? Problem Visit     shingles       HISTORY:      HPI: Chip is a 74 y.o. male undergoing physical and occupational therapy at [facility] transitional care unit. He is with PMHx of CAD, MI s/p BMS x2 to LAD and PTCA to D1 (2012), ischemic cardiomyopathy (LVEF 40-45%), paroxysmal atrial flutter (on sotalol and Xarelto), hypertension, dyslipidemia, CKD 3, DAVID (intolerant to CPAP) further records on 2/25/2020 patient was at the bank going back and forth and then sat down to rest on his walker because he felt off had a headache and weakness.  He slipped off his walker and fell on his ankle and then hit his knee.  He was unable to get up and was brought in by EMS complaining of right knee and ankle pain.  Of note he is with chronic back pain.  In the emergency department his vitals were hypotensive and he was given a liter of IV fluids he also was noted to have a low hemoglobin of 6.7 and imaging showed a fracture distal right fibula currently he is in a splint and he will follow-up with orthopedics tomorrow.    Today he is seen for reports of worsening rash.  Patient was noted to have an old rash on the back of his neck when I first met with him.  He reported he had the rash for approximately 2 weeks and it was scabbed over and was not linear.  Today rash with blistering and has spread to the left side of his shoulder towards the front of his neck and was weeping.  It did appear to look like shingles and he was started on acyclovir  and his triamcinolone  was discontinued.  He denies chest pain shortness of breath.  He is moving his bowels.  He  denies cough congestion.   He denies any numbness or tingling he is able to wiggle his toes and can feel touch.  Past Medical History:   Diagnosis Date   ? Acute on chronic anemia    ? ALEX (acute kidney injury) (H)    ? Atrial fibrillation (H)    ? CAD (coronary artery disease)    ? Chronic bronchitis (H)    ? Chronic hypercapnic respiratory failure (H)    ? Chronic lower back pain    ? Chronic systolic heart failure (H)    ? CKD (chronic kidney disease)    ? Closed right fibular fracture     distal end   ? COPD (chronic obstructive pulmonary disease) (H)    ? Depressive disorder    ? ED (erectile dysfunction)    ? HLD (hyperlipidemia)    ? Hypertension    ? Hypertrophy of prostate without urinary obstruction    ? Ischemic cardiomyopathy    ? Knee effusion, right    ? Lumbago    ? Opiate abuse, continuous (H)    ? DAVID (obstructive sleep apnea)    ? Prostate cancer (H)    ? Syncope    ? Syncope    ? Tobacco use disorder    ? Vitamin D deficiency              Family History   Problem Relation Age of Onset   ? Heart disease Father    ? Heart disease Brother      Social History     Socioeconomic History   ? Marital status:      Spouse name: Not on file   ? Number of children: Not on file   ? Years of education: Not on file   ? Highest education level: Not on file   Occupational History   ? Not on file   Social Needs   ? Financial resource strain: Not on file   ? Food insecurity:     Worry: Not on file     Inability: Not on file   ? Transportation needs:     Medical: Not on file     Non-medical: Not on file   Tobacco Use   ? Smoking status: Former Smoker     Packs/day: 2.00     Years: 50.00     Pack years: 100.00     Types: Cigarettes     Last attempt to quit: 2011     Years since quittin.1   ? Smokeless tobacco: Never Used   Substance and Sexual Activity   ? Alcohol use: No   ? Drug use: No   ? Sexual activity:  Never     Partners: Female   Lifestyle   ? Physical activity:     Days per week: Not on file     Minutes per session: Not on file   ? Stress: Not on file   Relationships   ? Social connections:     Talks on phone: Not on file     Gets together: Not on file     Attends Christian service: Not on file     Active member of club or organization: Not on file     Attends meetings of clubs or organizations: Not on file     Relationship status: Not on file   ? Intimate partner violence:     Fear of current or ex partner: Not on file     Emotionally abused: Not on file     Physically abused: Not on file     Forced sexual activity: Not on file   Other Topics Concern   ? Not on file   Social History Narrative   ? Not on file         Review of Systems   Constitutional: Positive for activity change. Negative for appetite change, chills, fatigue and fever.   HENT: Negative for congestion and sore throat.    Eyes: Negative for visual disturbance.   Respiratory: Negative for cough, shortness of breath and wheezing.    Cardiovascular: Negative for chest pain and leg swelling.   Gastrointestinal: Negative for abdominal distention, abdominal pain, constipation, diarrhea and nausea.   Genitourinary: Negative for dysuria.   Musculoskeletal: Negative for arthralgias and myalgias.   Skin: Negative for color change, rash and wound.        Rash with blistering posterior and neck left shoulder   Neurological: Negative for dizziness, weakness and numbness.   Psychiatric/Behavioral: Negative for agitation, behavioral problems and sleep disturbance.       Vitals:    03/04/20 0951   BP: 117/74   Pulse: 78   Resp: 18   Temp: 99  F (37.2  C)   SpO2: 95%   Weight: (!) 268 lb 12.8 oz (121.9 kg)       Physical Exam  Constitutional:       Appearance: He is well-developed.      Comments: Pleasant gentleman in no acute distress   HENT:      Head: Normocephalic.   Eyes:      Conjunctiva/sclera: Conjunctivae normal.   Neck:      Musculoskeletal: Normal  range of motion.   Cardiovascular:      Rate and Rhythm: Normal rate and regular rhythm.      Heart sounds: Normal heart sounds. No murmur.   Pulmonary:      Effort: No respiratory distress.      Breath sounds: Normal breath sounds. No wheezing or rales.   Abdominal:      General: Bowel sounds are normal. There is no distension.      Palpations: Abdomen is soft.      Tenderness: There is no abdominal tenderness.   Musculoskeletal: Normal range of motion.      Comments: Splint right foot follow-up with orthopedics   Skin:     General: Skin is warm.   Neurological:      Mental Status: He is alert and oriented to person, place, and time.   Psychiatric:         Behavior: Behavior normal.           LABS:   No results found for this or any previous visit (from the past 240 hour(s)).  Current Outpatient Medications   Medication Sig   ? acetaminophen (TYLENOL) 500 MG tablet Take 1,000 mg by mouth 3 (three) times a day.    ? atorvastatin (LIPITOR) 20 MG tablet Take 40 mg by mouth at bedtime.          ? ferrous sulfate 325 (65 FE) MG tablet Take 1 tablet by mouth daily with breakfast.   ? lisinopriL (PRINIVIL,ZESTRIL) 2.5 MG tablet Take 2.5 mg by mouth daily.   ? metoprolol succinate (TOPROL-XL) 25 MG Take 12.5 mg by mouth daily.   ? nitroglycerin (NITROSTAT) 0.4 MG SL tablet Place 0.4 mg under the tongue every 5 (five) minutes as needed for chest pain.   ? omeprazole (PRILOSEC) 20 MG capsule Take 20 mg by mouth daily before breakfast.   ? rivaroxaban (XARELTO) 20 mg tablet Take 20 mg by mouth daily with supper.   ? torsemide (DEMADEX) 10 MG tablet Take 10 mg by mouth daily as needed. Weight gain over 3 lbs or increased leg swelling     ASSESSMENT:      ICD-10-CM    1. Herpes zoster with complication B02.8        PLAN:    Atrial fibrillation on metoprolol, Xarelto    Congestive heart failure on Xarelto, Metoprolol, daily weights furosemide as needed    Shingles Acyclovir as ordered    Distal fracture of right fibula patient  currently in a splint will follow up with orthopedics on 3/3/2020, pain control, monitor for CMS changes    Pain control currently denies continue Tylenol 3 times daily    Anemia on ferrous sulfate hemoglobin on 2/27 7.8 up from 6.9    Hypertension on lisinopril and Toprol-XL    GERD continue omeprazole    Hyperlipidemia on atorvastatin  .    Electronically signed by: Malina Verdin CNP

## 2021-06-20 NOTE — LETTER
Letter by Malina Verdin CNP at      Author: Malina Verdin CNP Service: -- Author Type: --    Filed:  Encounter Date: 3/6/2020 Status: (Other)         Patient: Chip Martin   MR Number: [id number]   YOB: 1945   Date of Visit: 3/6/2020     [facility]    Facility:   [facility] [[id number]]   Code Status: FULL CODE      CHIEF COMPLAINT/REASON FOR VISIT:  Chief Complaint   Patient presents with   ? Problem Visit     gout       HISTORY:      HPI: Chip is a 74 y.o. male undergoing physical and occupational therapy at [facility] transitional care unit. He is with PMHx of CAD, MI s/p BMS x2 to LAD and PTCA to D1 (2012), ischemic cardiomyopathy (LVEF 40-45%), paroxysmal atrial flutter (on sotalol and Xarelto), hypertension, dyslipidemia, CKD 3, DAVID (intolerant to CPAP) further records on 2/25/2020 patient was at the bank going back and forth and then sat down to rest on his walker because he felt off had a headache and weakness.  He slipped off his walker and fell on his ankle and then hit his knee.  He was unable to get up and was brought in by EMS complaining of right knee and ankle pain.  Of note he is with chronic back pain.  In the emergency department his vitals were hypotensive and he was given a liter of IV fluids he also was noted to have a low hemoglobin of 6.7 and imaging showed a fracture distal right fibula currently he is in a splint and he will follow-up with orthopedics tomorrow.    Today he is seen for reports of pain left lateral toe. He was found to have redness left toe and reported positive history of gout. His pain was interfering with his therapy because he has weight restriction RLE.   He denies chest pain shortness of breath.  He is moving his bowels.  He  denies cough congestion.   He denies any numbness or tingling he is able to wiggle his toes and can feel touch.  Past Medical History:   Diagnosis Date   ? Acute on  chronic anemia    ? ALEX (acute kidney injury) (H)    ? Atrial fibrillation (H)    ? CAD (coronary artery disease)    ? Chronic bronchitis (H)    ? Chronic hypercapnic respiratory failure (H)    ? Chronic lower back pain    ? Chronic systolic heart failure (H)    ? CKD (chronic kidney disease)    ? Closed right fibular fracture     distal end   ? COPD (chronic obstructive pulmonary disease) (H)    ? Depressive disorder    ? ED (erectile dysfunction)    ? HLD (hyperlipidemia)    ? Hypertension    ? Hypertrophy of prostate without urinary obstruction    ? Ischemic cardiomyopathy    ? Knee effusion, right    ? Lumbago    ? Opiate abuse, continuous (H)    ? DAVID (obstructive sleep apnea)    ? Prostate cancer (H)    ? Syncope    ? Syncope    ? Tobacco use disorder    ? Vitamin D deficiency              Family History   Problem Relation Age of Onset   ? Heart disease Father    ? Heart disease Brother      Social History     Socioeconomic History   ? Marital status:      Spouse name: Not on file   ? Number of children: Not on file   ? Years of education: Not on file   ? Highest education level: Not on file   Occupational History   ? Not on file   Social Needs   ? Financial resource strain: Not on file   ? Food insecurity:     Worry: Not on file     Inability: Not on file   ? Transportation needs:     Medical: Not on file     Non-medical: Not on file   Tobacco Use   ? Smoking status: Former Smoker     Packs/day: 2.00     Years: 50.00     Pack years: 100.00     Types: Cigarettes     Last attempt to quit: 2011     Years since quittin.1   ? Smokeless tobacco: Never Used   Substance and Sexual Activity   ? Alcohol use: No   ? Drug use: No   ? Sexual activity: Never     Partners: Female   Lifestyle   ? Physical activity:     Days per week: Not on file     Minutes per session: Not on file   ? Stress: Not on file   Relationships   ? Social connections:     Talks on phone: Not on file     Gets together: Not on file      Attends [employer] service: Not on file     Active member of club or organization: Not on file     Attends meetings of clubs or organizations: Not on file     Relationship status: Not on file   ? Intimate partner violence:     Fear of current or ex partner: Not on file     Emotionally abused: Not on file     Physically abused: Not on file     Forced sexual activity: Not on file   Other Topics Concern   ? Not on file   Social History Narrative   ? Not on file         Review of Systems   Constitutional: Positive for activity change. Negative for appetite change, chills, fatigue and fever.   HENT: Negative for congestion and sore throat.    Eyes: Negative for visual disturbance.   Respiratory: Negative for cough, shortness of breath and wheezing.    Cardiovascular: Negative for chest pain and leg swelling.   Gastrointestinal: Negative for abdominal distention, abdominal pain, constipation, diarrhea and nausea.   Genitourinary: Negative for dysuria.   Musculoskeletal: Negative for arthralgias and myalgias.   Skin: Negative for color change, rash and wound.        Rash with blistering posterior and neck left shoulder   Neurological: Negative for dizziness, weakness and numbness.   Psychiatric/Behavioral: Negative for agitation, behavioral problems and sleep disturbance.       Vitals:    03/06/20 1049   BP: 121/79   Pulse: 68   Resp: 18   Temp: 98.8  F (37.1  C)   SpO2: 92%   Weight: (!) 266 lb 4.8 oz (120.8 kg)       Physical Exam  Constitutional:       Appearance: He is well-developed.      Comments: Pleasant gentleman in no acute distress   HENT:      Head: Normocephalic.   Eyes:      Conjunctiva/sclera: Conjunctivae normal.   Neck:      Musculoskeletal: Normal range of motion.   Cardiovascular:      Rate and Rhythm: Normal rate and regular rhythm.      Heart sounds: Normal heart sounds. No murmur.   Pulmonary:      Effort: No respiratory distress.      Breath sounds: Normal breath sounds. No wheezing or rales.    Abdominal:      General: Bowel sounds are normal. There is no distension.      Palpations: Abdomen is soft.      Tenderness: There is no abdominal tenderness.   Musculoskeletal: Normal range of motion.      Comments: Splint right foot follow-up with orthopedics   Skin:     General: Skin is warm.   Neurological:      Mental Status: He is alert and oriented to person, place, and time.   Psychiatric:         Behavior: Behavior normal.           LABS:   No results found for this or any previous visit (from the past 240 hour(s)).  Current Outpatient Medications   Medication Sig   ? acetaminophen (TYLENOL) 500 MG tablet Take 1,000 mg by mouth 3 (three) times a day.    ? atorvastatin (LIPITOR) 20 MG tablet Take 40 mg by mouth at bedtime.          ? ferrous sulfate 325 (65 FE) MG tablet Take 1 tablet by mouth daily with breakfast.   ? lisinopriL (PRINIVIL,ZESTRIL) 2.5 MG tablet Take 2.5 mg by mouth daily.   ? metoprolol succinate (TOPROL-XL) 25 MG Take 12.5 mg by mouth daily.   ? nitroglycerin (NITROSTAT) 0.4 MG SL tablet Place 0.4 mg under the tongue every 5 (five) minutes as needed for chest pain.   ? omeprazole (PRILOSEC) 20 MG capsule Take 20 mg by mouth daily before breakfast.   ? rivaroxaban (XARELTO) 20 mg tablet Take 20 mg by mouth daily with supper.   ? torsemide (DEMADEX) 10 MG tablet Take 10 mg by mouth daily as needed. Weight gain over 3 lbs or increased leg swelling     ASSESSMENT:      ICD-10-CM    1. Acute gout involving toe of left foot, unspecified cause M10.9        PLAN:    Gout- colchicine 1.2 mg x 1 followed by 0.6mg 1 hour later.     Atrial fibrillation on metoprolol, Xarelto    Congestive heart failure on Xarelto, Metoprolol, daily weights furosemide as needed    Shingles Acyclovir as ordered    Distal fracture of right fibula patient currently in a splint will follow up with orthopedics on 3/3/2020, pain control, monitor for CMS changes    Pain control currently denies continue Tylenol 3 times  daily    Anemia on ferrous sulfate hemoglobin on 2/27 7.8 up from 6.9    Hypertension on lisinopril and Toprol-XL    GERD continue omeprazole    Hyperlipidemia on atorvastatin  .    Electronically signed by: Malina Verdin CNP

## 2021-06-20 NOTE — LETTER
Letter by Malina Verdin CNP at      Author: Malina Verdin CNP Service: -- Author Type: --    Filed:  Encounter Date: 3/2/2020 Status: (Other)         Patient: Chip Martin   MR Number: [id number]   YOB: 1945   Date of Visit: 3/2/2020     [facility]    Facility:   [facility] [[id number]]   Code Status: FULL CODE      CHIEF COMPLAINT/REASON FOR VISIT:  Chief Complaint   Patient presents with   ? Review Of Multiple Medical Conditions       HISTORY:      HPI: Chip is a 74 y.o. male undergoing physical and occupational therapy at [facility] transitional care unit. He is with PMHx of CAD, MI s/p BMS x2 to LAD and PTCA to D1 (2012), ischemic cardiomyopathy (LVEF 40-45%), paroxysmal atrial flutter (on sotalol and Xarelto), hypertension, dyslipidemia, CKD 3, DAVID (intolerant to CPAP) further records on 2/25/2020 patient was at the bank going back and forth and then sat down to rest on his walker because he felt off had a headache and weakness.  He slipped off his walker and fell on his ankle and then hit his knee.  He was unable to get up and was brought in by EMS complaining of right knee and ankle pain.  Of note he is with chronic back pain.  In the emergency department his vitals were hypotensive and he was given a liter of IV fluids he also was noted to have a low hemoglobin of 6.7 and imaging showed a fracture distal right fibula currently he is in a splint and he will follow-up with orthopedics tomorrow.    Today he is seen for a first routine visit to review multiple medical issues.  He denies chest pain shortness of breath denies constipation or diarrhea and then reported he does get constipated at times and may in a few days. He  denies cough congestion.  His weights were reviewed and he is down 4 pounds in the last 5 days, he does have PRN torsemide for weight gain.  Was noted to have a rash in the back of his neck.  The rash did  appear to be scabbed over and he tells me he has had approximately 2 weeks and does not know how he got it.  The rash was not linear.  He continues to tell me he broke a back scratcher by scratching it so much.  I did order him some triamcinolone ointment twice daily until healed.  Is currently being a splint right foot.  He denies any numbness or tingling he is able to wiggle his toes and can feel touch.  Past Medical History:   Diagnosis Date   ? Acute on chronic anemia    ? ALEX (acute kidney injury) (H)    ? Atrial fibrillation (H)    ? CAD (coronary artery disease)    ? Chronic bronchitis (H)    ? Chronic hypercapnic respiratory failure (H)    ? Chronic lower back pain    ? Chronic systolic heart failure (H)    ? CKD (chronic kidney disease)    ? Closed right fibular fracture     distal end   ? COPD (chronic obstructive pulmonary disease) (H)    ? Depressive disorder    ? ED (erectile dysfunction)    ? HLD (hyperlipidemia)    ? Hypertension    ? Hypertrophy of prostate without urinary obstruction    ? Ischemic cardiomyopathy    ? Knee effusion, right    ? Lumbago    ? Opiate abuse, continuous (H)    ? DAVID (obstructive sleep apnea)    ? Prostate cancer (H)    ? Syncope    ? Syncope    ? Tobacco use disorder    ? Vitamin D deficiency              Family History   Problem Relation Age of Onset   ? Heart disease Father    ? Heart disease Brother      Social History     Socioeconomic History   ? Marital status:      Spouse name: Not on file   ? Number of children: Not on file   ? Years of education: Not on file   ? Highest education level: Not on file   Occupational History   ? Not on file   Social Needs   ? Financial resource strain: Not on file   ? Food insecurity:     Worry: Not on file     Inability: Not on file   ? Transportation needs:     Medical: Not on file     Non-medical: Not on file   Tobacco Use   ? Smoking status: Former Smoker     Packs/day: 2.00     Years: 50.00     Pack years: 100.00      Types: Cigarettes     Last attempt to quit: 2011     Years since quittin.1   ? Smokeless tobacco: Never Used   Substance and Sexual Activity   ? Alcohol use: No   ? Drug use: No   ? Sexual activity: Never     Partners: Female   Lifestyle   ? Physical activity:     Days per week: Not on file     Minutes per session: Not on file   ? Stress: Not on file   Relationships   ? Social connections:     Talks on phone: Not on file     Gets together: Not on file     Attends [employer] service: Not on file     Active member of club or organization: Not on file     Attends meetings of clubs or organizations: Not on file     Relationship status: Not on file   ? Intimate partner violence:     Fear of current or ex partner: Not on file     Emotionally abused: Not on file     Physically abused: Not on file     Forced sexual activity: Not on file   Other Topics Concern   ? Not on file   Social History Narrative   ? Not on file         Review of Systems   Constitutional: Negative for activity change, appetite change, chills, fatigue and fever.   HENT: Negative for congestion and sore throat.    Eyes: Negative for visual disturbance.   Respiratory: Negative for cough, shortness of breath and wheezing.    Cardiovascular: Negative for chest pain and leg swelling.   Gastrointestinal: Negative for abdominal distention, abdominal pain, constipation, diarrhea and nausea.   Genitourinary: Negative for dysuria.   Musculoskeletal: Negative for arthralgias and myalgias.   Skin: Negative for color change, rash and wound.   Neurological: Negative for dizziness, weakness and numbness.   Psychiatric/Behavioral: Negative for agitation, behavioral problems and sleep disturbance.       Vitals:    20 1005   BP: 150/72   Pulse: 70   Resp: 18   Temp: 98.5  F (36.9  C)   SpO2: 93%   Weight: (!) 271 lb (122.9 kg)       Physical Exam  Constitutional:       Appearance: He is well-developed.   HENT:      Head: Normocephalic.   Eyes:       Conjunctiva/sclera: Conjunctivae normal.   Neck:      Musculoskeletal: Normal range of motion.   Cardiovascular:      Rate and Rhythm: Normal rate and regular rhythm.      Heart sounds: Normal heart sounds. No murmur.   Pulmonary:      Effort: No respiratory distress.      Breath sounds: Normal breath sounds. No wheezing or rales.   Abdominal:      General: Bowel sounds are normal. There is no distension.      Palpations: Abdomen is soft.      Tenderness: There is no abdominal tenderness.   Musculoskeletal: Normal range of motion.   Skin:     General: Skin is warm.   Neurological:      Mental Status: He is alert and oriented to person, place, and time.   Psychiatric:         Behavior: Behavior normal.           LABS:   No results found for this or any previous visit (from the past 240 hour(s)).  Current Outpatient Medications   Medication Sig   ? acetaminophen (TYLENOL) 500 MG tablet Take 1,000 mg by mouth 3 (three) times a day.    ? atorvastatin (LIPITOR) 20 MG tablet Take 40 mg by mouth at bedtime.          ? ferrous sulfate 325 (65 FE) MG tablet Take 1 tablet by mouth daily with breakfast.   ? lisinopriL (PRINIVIL,ZESTRIL) 2.5 MG tablet Take 2.5 mg by mouth daily.   ? metoprolol succinate (TOPROL-XL) 25 MG Take 12.5 mg by mouth daily.   ? nitroglycerin (NITROSTAT) 0.4 MG SL tablet Place 0.4 mg under the tongue every 5 (five) minutes as needed for chest pain.   ? omeprazole (PRILOSEC) 20 MG capsule Take 20 mg by mouth daily before breakfast.   ? rivaroxaban (XARELTO) 20 mg tablet Take 20 mg by mouth daily with supper.   ? torsemide (DEMADEX) 10 MG tablet Take 10 mg by mouth daily as needed. Weight gain over 3 lbs or increased leg swelling     ASSESSMENT:      ICD-10-CM    1. Persistent atrial fibrillation I48.19    2. Congestive heart failure, unspecified HF chronicity, unspecified heart failure type (H) I50.9    3. Rash R21    4. Closed fracture of distal end of right fibula with routine healing, unspecified  fracture morphology, subsequent encounter S80.828P        PLAN:    Atrial fibrillation on metoprolol, Xarelto    Congestive heart failure on Xarelto, Metoprolol, daily weights furosemide as needed    Rash-triamcinolone ointment twice daily until healed posterior neck    Distal fracture of right fibula patient currently in a splint will follow up with orthopedics on 3/3/2020, pain control, monitor for CMS changes    Pain control currently denies continue Tylenol 3 times daily    Anemia on ferrous sulfate hemoglobin on 2/27 7.8 up from 6.9    Hypertension on lisinopril and Toprol-XL    GERD continue omeprazole    Hyperlipidemia on atorvastatin  .    Electronically signed by: Malina Verdin CNP

## 2021-06-20 NOTE — LETTER
Letter by Malina Verdin CNP at      Author: Malina Verdin CNP Service: -- Author Type: --    Filed:  Encounter Date: 3/23/2020 Status: (Other)         Patient: Chip Martin   MR Number: [id number]   YOB: 1945   Date of Visit: 3/23/2020     [facility]    Facility:   [facility] [[id number]]   Code Status: FULL CODE  PCP: Lynne Gomez MD   Phone: [phone]   Fax: [phone]      CHIEF COMPLAINT/REASON FOR VISIT:  Chief Complaint   Patient presents with   ? Discharge Summary       HISTORY COURSE:  Chip is a 74 y.o. male undergoing physical and occupational therapy at [facility] transitional care unit. He is with PMHx of CAD, MI s/p BMS x2 to LAD and PTCA to D1 (2012), ischemic cardiomyopathy (LVEF 40-45%), paroxysmal atrial flutter (on sotalol and Xarelto), hypertension, dyslipidemia, CKD 3, DAVID (intolerant to CPAP) further records on 2/25/2020 patient was at the bank going back and forth and then sat down to rest on his walker because he felt off had a headache and weakness.  He slipped off his walker and fell on his ankle and then hit his knee.  He was unable to get up and was brought in by EMS complaining of right knee and ankle pain.  Of note he is with chronic back pain.  In the emergency department his vitals were hypotensive and he was given a liter of IV fluids he also was noted to have a low hemoglobin of 6.7 and imaging showed a fracture distal right fibula currently he is in a splint and he will follow-up with orthopedics tomorrow.     Today he is seen for a face to face for discharge. He will discharge to home on 3/24/20 with current medications and treatments. He declined all home care services.  He denies chest pain shortness of breath.  He is moving his bowels.  He  denies cough congestion.   He denies any numbness or tingling.  He rated is pain 4/10 and is currently on ES Tylenol. His shingles rash is resolving and  he has a few scabs blisters left.     Review of Systems  Constitutional: Positive for activity change. Negative for appetite change, chills, fatigue and fever.   HENT: Negative for congestion and sore throat.    Eyes: Negative for visual disturbance.   Respiratory: Negative for cough, shortness of breath and wheezing.    Cardiovascular: Negative for chest pain and leg swelling.   Gastrointestinal: Negative for abdominal distention, abdominal pain, constipation, diarrhea and nausea.   Genitourinary: Negative for dysuria.   Musculoskeletal: Negative for arthralgias and myalgias.   Skin: Negative for color change, rash and wound.        Rash with blistering posterior and neck left shoulder   Neurological: Negative for dizziness, weakness and numbness.   Psychiatric/Behavioral: Negative for agitation, behavioral problems and sleep disturbance.   Vitals:    03/23/20 1059   BP: 140/68   Pulse: 65   Resp: 18   Temp: 98  F (36.7  C)   SpO2: 92%   Weight: (!) 264 lb 9.6 oz (120 kg)       Physical Exam  Constitutional:       Appearance: He is well-developed.      Comments: Pleasant gentleman in no acute distress   HENT:      Head: Normocephalic.   Eyes:      Conjunctiva/sclera: Conjunctivae normal.   Neck:      Musculoskeletal: Normal range of motion.   Cardiovascular:      Rate and Rhythm: Normal rate and regular rhythm.      Heart sounds: Normal heart sounds. No murmur.   Pulmonary:      Effort: No respiratory distress.      Breath sounds: Normal breath sounds. No wheezing or rales.   Abdominal:      General: Bowel sounds are normal. There is no distension.      Palpations: Abdomen is soft.      Tenderness: There is no abdominal tenderness.   Musculoskeletal: Normal range of motion.      Comments: Cam boot right foot follow-up with orthopedics   Skin:     General: Skin is warm.   Neurological:      Mental Status: He is alert and oriented to person, place, and time.   Psychiatric:         Behavior: Behavior normal.      MEDICATION LIST:  Current Outpatient Medications   Medication Sig   ? acetaminophen (TYLENOL) 500 MG tablet Take 1,000 mg by mouth 3 (three) times a day.    ? atorvastatin (LIPITOR) 20 MG tablet Take 40 mg by mouth at bedtime.          ? ferrous sulfate 325 (65 FE) MG tablet Take 1 tablet by mouth daily with breakfast.   ? lisinopriL (PRINIVIL,ZESTRIL) 2.5 MG tablet Take 2.5 mg by mouth daily.   ? metoprolol succinate (TOPROL-XL) 25 MG Take 12.5 mg by mouth daily.   ? nitroglycerin (NITROSTAT) 0.4 MG SL tablet Place 0.4 mg under the tongue every 5 (five) minutes as needed for chest pain.   ? omeprazole (PRILOSEC) 20 MG capsule Take 20 mg by mouth daily before breakfast.   ? rivaroxaban (XARELTO) 20 mg tablet Take 20 mg by mouth daily with supper.   ? senna-docusate (SENNOSIDES-DOCUSATE SODIUM) 8.6-50 mg tablet Take 1 tablet by mouth 2 (two) times a day as needed for constipation.   ? torsemide (DEMADEX) 10 MG tablet Take 10 mg by mouth daily as needed. Weight gain over 3 lbs or increased leg swelling       DISCHARGE DIAGNOSIS:    ICD-10-CM    1. Persistent atrial fibrillation  I48.19    2. Closed fracture of distal end of right fibula with routine healing, unspecified fracture morphology, subsequent encounter  S82.831D        MEDICAL EQUIPMENT NEEDS:  None     DISCHARGE PLAN/FACE TO FACE:  I certify that services are/were furnished while this patient was under the care of a physician and that a physician or an allowed non-physician practitioner (NPP), had a face-to-face encounter that meets the physician face-to-face encounter requirements. The encounter was in whole, or in part, related to the primary reason for home health. The patient is confined to his/her home and needs intermittent skilled nursing, physical therapy, speech-language pathology, or the continued need for occupational therapy. A plan of care has been established by a physician and is periodically reviewed by a physician.  Date of Face-to-Face  Encounter: 3/23/20    I certify that, based on my findings, the following services are medically necessary home health services:declined services    My clinical findings support the need for the above skilled services because: declined services    This patient is homebound because: N/A    The patient is, or has been, under my care and I have initiated the establishment of the plan of care. This patient will be followed by a physician who will periodically review the plan of care.    Schedule follow up visit with primary care provider within 7 days to reestablish care.    Electronically signed by: Malina Verdin CNP

## 2021-06-20 NOTE — LETTER
"Letter by Michaela Navarro MD at      Author: Michaela Navarro MD Service: -- Author Type: --    Filed:  Encounter Date: 3/12/2020 Status: (Other)         Patient: Chip Martin   MR Number: [id number]   YOB: 1945   Date of Visit: 3/12/2020     [facility]      Facility:    [facility] [[id number]]  Code Status: FULL CODE       Chief Complaint/Reason for Visit:  Chief Complaint   Patient presents with   ? H & P     Admit note to TCU for a fall with right fibula fracture.        HPI:   Chip is a 74 y.o. male with hx of atrial flutter on xarelto, CAD, CM, CHF, CKD, HTN, admitted to the hospital on 2/25/20 after a fall with right ankle pain.     HISTORY OF PRESENT ILLNESS: Chip Martin is a 74 y.o. male with a history as noted who had a fall the AM of admission and now presenting with ankle pain.  Patient has not felt himself this last week, attributes most of that to back pain (which is chronic for many years), but also feeling fatigued. He gets worsening weakness/deconditioning and dyspnea when he exerts himself. [place] the bank going back and forth for a few things then sat down to rest on his walker when he felt \"off\", with a headache, weakness. He slipped off his walker and felt on his ankle and then hit his knee. He was unable to get up, was brought in by EMS complaining of right knee and ankle pain. Was lethargic initially, this improved. He denies alcohol and drug use. Denies prodromal symptoms (ie palpitations, lightheadedness, dizziness, orthostasis, chest pain), LOC/head trauma, sick contacts/prior illness/recent travel. Currently he has pain in his back \"I've seen all the pain docs here and they all hate me\" and right ankle. In the ED his vitals were hypotensive (responded to 1L IVF). His labs showed Hgb 6.7. Imaging showed fractured distal right fibula. Ortho consulted in the ED. He was admitted for further management and evaluation. "     Hospital Course:   Chip Martin is a 74 y.o. male with a history of chronic kidney disease, hypertension, obstructive sleep apnea, atrial flutter on Xarelto, chronic systolic CHF who had a fall the AM of admission and now presenting with ankle pain. See H&P for full details (From 2/25/20). Imaging showed a distal fracture of right fibula. Due to his fall, ankle fracture and anticoagulation state he was admitted. CT head negative for bleed; initial hypotension improved with fluids. Ortho consulted who recommend non-surgical interventions. PT/OT recommend TCU and wheelchair due to his NWB status and obesity/debility making it difficult to be NWB with his walker. Hgb was noted to be 6.7 on admission, MCV very low; Ferritin checked due to his history of Fe deficiency anemia and came back at 3.0. He was given IV Fe 2/26/20 then started on PO replacement. He will follow up with Orthopedics in 1 week. He was discharged to TCU in stable condition on 2/28/2020 and was in agreement with the above plan.     Overall stabilized and discharged to TCU on 2/28/20 for PT, OT, nursing cares, medical management and monitoring.     Today:  He is in a cast for RLE, orders for TTWB. He will see ortho for follow up next week. Has no pain in ankle at this time. He is otherwise doing well. Denies cough, congestion, fever. No dizziness or palpitations. No shortness of breath or chest pain. Appetite is good. No diarrhea or constipation. He is hopeful to go home soon, lives in an apt. No new vision or hearing problems.       Past Medical History:  Past Medical History:   Diagnosis Date   ? Acute on chronic anemia    ? ALEX (acute kidney injury) (H)    ? Atrial fibrillation (H)    ? CAD (coronary artery disease)    ? Chronic bronchitis (H)    ? Chronic hypercapnic respiratory failure (H)    ? Chronic lower back pain    ? Chronic systolic heart failure (H)    ? CKD (chronic kidney disease)    ? Closed right fibular fracture     distal end    ? COPD (chronic obstructive pulmonary disease) (H)    ? Depressive disorder    ? ED (erectile dysfunction)    ? HLD (hyperlipidemia)    ? Hypertension    ? Hypertrophy of prostate without urinary obstruction    ? Ischemic cardiomyopathy    ? Knee effusion, right    ? Lumbago    ? Opiate abuse, continuous (H)    ? DAVID (obstructive sleep apnea)    ? Prostate cancer (H)    ? Syncope    ? Syncope    ? Tobacco use disorder    ? Vitamin D deficiency            Surgical History:  Past Surgical History:   Procedure Laterality Date   ? BACK SURGERY     ? CORONARY ANGIOPLASTY     ? LAPAROSCOPIC CHOLECYSTECTOMY  2016   ? PROSTATE BIOPSY         Family History:   Family History   Problem Relation Age of Onset   ? Heart disease Father    ? Heart disease Brother        Social History:    Social History     Socioeconomic History   ? Marital status:      Spouse name: Not on file   ? Number of children: Not on file   ? Years of education: Not on file   ? Highest education level: Not on file   Occupational History   ? Not on file   Social Needs   ? Financial resource strain: Not on file   ? Food insecurity     Worry: Not on file     Inability: Not on file   ? Transportation needs     Medical: Not on file     Non-medical: Not on file   Tobacco Use   ? Smoking status: Former Smoker     Packs/day: 2.00     Years: 50.00     Pack years: 100.00     Types: Cigarettes     Last attempt to quit: 2011     Years since quittin.1   ? Smokeless tobacco: Never Used   Substance and Sexual Activity   ? Alcohol use: No   ? Drug use: No   ? Sexual activity: Never     Partners: Female   Lifestyle   ? Physical activity     Days per week: Not on file     Minutes per session: Not on file   ? Stress: Not on file   Relationships   ? Social connections     Talks on phone: Not on file     Gets together: Not on file     Attends Zoroastrianism service: Not on file     Active member of club or organization: Not on file     Attends meetings of  "clubs or organizations: Not on file     Relationship status: Not on file   ? Intimate partner violence     Fear of current or ex partner: Not on file     Emotionally abused: Not on file     Physically abused: Not on file     Forced sexual activity: Not on file   Other Topics Concern   ? Not on file   Social History Narrative   ? Not on file       Medications:  Current Outpatient Medications   Medication Sig   ? acetaminophen (TYLENOL) 500 MG tablet Take 1,000 mg by mouth 3 (three) times a day.    ? atorvastatin (LIPITOR) 20 MG tablet Take 40 mg by mouth at bedtime.          ? ferrous sulfate 325 (65 FE) MG tablet Take 1 tablet by mouth daily with breakfast.   ? lisinopriL (PRINIVIL,ZESTRIL) 2.5 MG tablet Take 2.5 mg by mouth daily.   ? metoprolol succinate (TOPROL-XL) 25 MG Take 12.5 mg by mouth daily.   ? nitroglycerin (NITROSTAT) 0.4 MG SL tablet Place 0.4 mg under the tongue every 5 (five) minutes as needed for chest pain.   ? omeprazole (PRILOSEC) 20 MG capsule Take 20 mg by mouth daily before breakfast.   ? rivaroxaban (XARELTO) 20 mg tablet Take 20 mg by mouth daily with supper.   ? torsemide (DEMADEX) 10 MG tablet Take 10 mg by mouth daily as needed. Weight gain over 3 lbs or increased leg swelling       Allergies:  Allergies   Allergen Reactions   ? Blood-Group Specific Substance      Patient has Anti-Big E antibody.  Blood product orders may be delayed.  Please draw one red top and two lavender top tubes for all Type and Screen/Type and Crossmatch orders.   ? Gadopentetate Dimeglumine      Other reaction(s): Syncope  \"on the floor\", states that he passed out   ? Gadoversetamide      Other reaction(s): Syncope  \"on the floor\", states that he passed out   ? Iodinated Contrast Media      Syncope          Review of Systems:  Pertinent items are noted in HPI.      Physical Exam:   General: Patient is alert male, no distress.   Vitals: /84, Temp 98.8, Pulse 84, RR 20, O2 sat 94%RA.  HEENT: Head is NCAT. " Eyes show no injection or icterus. Nares negative. Oropharynx well hydrated.  Neck: Supple. No tenderness or adenopathy. No JVD.  Lungs: Clear bilaterally. No wheezes.  Cardiovascular: Regular rate and rhythm, normal S1, S2.  Back: No spinal or CVA tenderness.  Abdomen: Obese, soft, no tenderness on exam. Bowel sounds present. No guarding rebound or rigidity.  : Deferred.  Extremities: Right LE cast.    Musculoskeletal: Degen changes.   Skin: Warm and dry.  Psych: Mood appears good.      Labs:  Lab Results   Component Value Date    WBC 7.6 09/09/2016    HGB 16.8 09/09/2016    HCT 50.1 09/09/2016    MCV 90 09/09/2016     09/09/2016     Results for orders placed or performed in visit on 06/21/19   Basic Metabolic Panel   Result Value Ref Range    Sodium 141 136 - 145 mmol/L    Potassium 3.7 3.5 - 5.0 mmol/L    Chloride 107 98 - 107 mmol/L    CO2 26 22 - 31 mmol/L    Anion Gap, Calculation 8 5 - 18 mmol/L    Glucose 104 70 - 125 mg/dL    Calcium 8.7 8.5 - 10.5 mg/dL    BUN 15 8 - 28 mg/dL    Creatinine 1.10 0.70 - 1.30 mg/dL    GFR MDRD Af Amer >60 >60 mL/min/1.73m2    GFR MDRD Non Af Amer >60 >60 mL/min/1.73m2       Assessment/Plan:  1. Right distal fibula fracture. Non operative management, in a cast. Initially NWB, now TTWB after 3/3/20 ortho visit. Follow up with ortho again on 3/17/20.  2. Atrial flutter. He is on xarelto. Adequate rate control with metoprolol.  3. HTN. Also on lisinopril and diuretics. Monitor BPs in TCU.  4. CHD, CM, hx CAD. Continue torsemide, statin. Possible syncopal episode contributing to fall. Follow up with cardiology.  5. Anemia. Hgb 6.7 upon hospital admit. Received IV iron, continue oral. Follow up post TCU with PMD for recheck and further considerations of chronic anemia.  6. CKD. With ALEX in the hospital.   7. Chronic back pain.  8. Code status is full code.          Electronically signed by: Michaela Navarro MD

## 2021-06-28 NOTE — PROGRESS NOTES
Progress Notes by Malina Verdin CNP at 3/23/2020 10:58 AM     Author: Malina Verdin CNP Service: -- Author Type: Nurse Practitioner    Filed: 3/23/2020  9:28 PM Encounter Date: 3/23/2020 Status: Attested    : Malina Verdin CNP (Nurse Practitioner) Cosigner: Michaela Navarro MD at 3/23/2020  9:50 PM    Attestation signed by Michaela Navarro MD at 3/23/2020  9:50 PM    Electronically signed by: Michaela Navarro MD                  [facility]    Facility:   [facility] [[id number]]   Code Status: FULL CODE  PCP: Lynne Gomez MD   Phone: [phone]   Fax: [phone]      CHIEF COMPLAINT/REASON FOR VISIT:  Chief Complaint   Patient presents with   ? Discharge Summary       HISTORY COURSE:  Chip is a 74 y.o. male undergoing physical and occupational therapy at [facility] transitional care unit. He is with PMHx of CAD, MI s/p BMS x2 to LAD and PTCA to D1 (2012), ischemic cardiomyopathy (LVEF 40-45%), paroxysmal atrial flutter (on sotalol and Xarelto), hypertension, dyslipidemia, CKD 3, DAVID (intolerant to CPAP) further records on 2/25/2020 patient was at the bank going back and forth and then sat down to rest on his walker because he felt off had a headache and weakness.  He slipped off his walker and fell on his ankle and then hit his knee.  He was unable to get up and was brought in by EMS complaining of right knee and ankle pain.  Of note he is with chronic back pain.  In the emergency department his vitals were hypotensive and he was given a liter of IV fluids he also was noted to have a low hemoglobin of 6.7 and imaging showed a fracture distal right fibula currently he is in a splint and he will follow-up with orthopedics tomorrow.     Today he is seen for a face to face for discharge. He will discharge to home on 3/24/20 with current medications and treatments. He declined all home care services.  He denies chest pain shortness of  breath.  He is moving his bowels.  He  denies cough congestion.   He denies any numbness or tingling.  He rated is pain 4/10 and is currently on ES Tylenol. His shingles rash is resolving and he has a few scabs blisters left.     Review of Systems  Constitutional: Positive for activity change. Negative for appetite change, chills, fatigue and fever.   HENT: Negative for congestion and sore throat.    Eyes: Negative for visual disturbance.   Respiratory: Negative for cough, shortness of breath and wheezing.    Cardiovascular: Negative for chest pain and leg swelling.   Gastrointestinal: Negative for abdominal distention, abdominal pain, constipation, diarrhea and nausea.   Genitourinary: Negative for dysuria.   Musculoskeletal: Negative for arthralgias and myalgias.   Skin: Negative for color change, rash and wound.        Rash with blistering posterior and neck left shoulder   Neurological: Negative for dizziness, weakness and numbness.   Psychiatric/Behavioral: Negative for agitation, behavioral problems and sleep disturbance.   Vitals:    03/23/20 1059   BP: 140/68   Pulse: 65   Resp: 18   Temp: 98  F (36.7  C)   SpO2: 92%   Weight: (!) 264 lb 9.6 oz (120 kg)       Physical Exam  Constitutional:       Appearance: He is well-developed.      Comments: Pleasant gentleman in no acute distress   HENT:      Head: Normocephalic.   Eyes:      Conjunctiva/sclera: Conjunctivae normal.   Neck:      Musculoskeletal: Normal range of motion.   Cardiovascular:      Rate and Rhythm: Normal rate and regular rhythm.      Heart sounds: Normal heart sounds. No murmur.   Pulmonary:      Effort: No respiratory distress.      Breath sounds: Normal breath sounds. No wheezing or rales.   Abdominal:      General: Bowel sounds are normal. There is no distension.      Palpations: Abdomen is soft.      Tenderness: There is no abdominal tenderness.   Musculoskeletal: Normal range of motion.      Comments: Cam boot right foot follow-up with  orthopedics   Skin:     General: Skin is warm.   Neurological:      Mental Status: He is alert and oriented to person, place, and time.   Psychiatric:         Behavior: Behavior normal.     MEDICATION LIST:  Current Outpatient Medications   Medication Sig   ? acetaminophen (TYLENOL) 500 MG tablet Take 1,000 mg by mouth 3 (three) times a day.    ? atorvastatin (LIPITOR) 20 MG tablet Take 40 mg by mouth at bedtime.          ? ferrous sulfate 325 (65 FE) MG tablet Take 1 tablet by mouth daily with breakfast.   ? lisinopriL (PRINIVIL,ZESTRIL) 2.5 MG tablet Take 2.5 mg by mouth daily.   ? metoprolol succinate (TOPROL-XL) 25 MG Take 12.5 mg by mouth daily.   ? nitroglycerin (NITROSTAT) 0.4 MG SL tablet Place 0.4 mg under the tongue every 5 (five) minutes as needed for chest pain.   ? omeprazole (PRILOSEC) 20 MG capsule Take 20 mg by mouth daily before breakfast.   ? rivaroxaban (XARELTO) 20 mg tablet Take 20 mg by mouth daily with supper.   ? senna-docusate (SENNOSIDES-DOCUSATE SODIUM) 8.6-50 mg tablet Take 1 tablet by mouth 2 (two) times a day as needed for constipation.   ? torsemide (DEMADEX) 10 MG tablet Take 10 mg by mouth daily as needed. Weight gain over 3 lbs or increased leg swelling       DISCHARGE DIAGNOSIS:    ICD-10-CM    1. Persistent atrial fibrillation  I48.19    2. Closed fracture of distal end of right fibula with routine healing, unspecified fracture morphology, subsequent encounter  S82.666P        MEDICAL EQUIPMENT NEEDS:  None     DISCHARGE PLAN/FACE TO FACE:  I certify that services are/were furnished while this patient was under the care of a physician and that a physician or an allowed non-physician practitioner (NPP), had a face-to-face encounter that meets the physician face-to-face encounter requirements. The encounter was in whole, or in part, related to the primary reason for home health. The patient is confined to his/her home and needs intermittent skilled nursing, physical therapy,  speech-language pathology, or the continued need for occupational therapy. A plan of care has been established by a physician and is periodically reviewed by a physician.  Date of Face-to-Face Encounter: 3/23/20    I certify that, based on my findings, the following services are medically necessary home health services:declined services    My clinical findings support the need for the above skilled services because: declined services    This patient is homebound because: N/A    The patient is, or has been, under my care and I have initiated the establishment of the plan of care. This patient will be followed by a physician who will periodically review the plan of care.    Schedule follow up visit with primary care provider within 7 days to reestablish care.    Electronically signed by: Malina Verdin CNP